# Patient Record
Sex: FEMALE | Race: WHITE | HISPANIC OR LATINO | Employment: OTHER | URBAN - METROPOLITAN AREA
[De-identification: names, ages, dates, MRNs, and addresses within clinical notes are randomized per-mention and may not be internally consistent; named-entity substitution may affect disease eponyms.]

---

## 2020-05-19 ENCOUNTER — OFFICE VISIT (OUTPATIENT)
Dept: FAMILY MEDICINE CLINIC | Facility: CLINIC | Age: 65
End: 2020-05-19
Payer: COMMERCIAL

## 2020-05-19 VITALS
HEIGHT: 57 IN | TEMPERATURE: 99 F | HEART RATE: 80 BPM | RESPIRATION RATE: 18 BRPM | OXYGEN SATURATION: 98 % | SYSTOLIC BLOOD PRESSURE: 144 MMHG | DIASTOLIC BLOOD PRESSURE: 82 MMHG | WEIGHT: 143 LBS | BODY MASS INDEX: 30.85 KG/M2

## 2020-05-19 DIAGNOSIS — Z11.59 ENCOUNTER FOR HEPATITIS C SCREENING TEST FOR LOW RISK PATIENT: ICD-10-CM

## 2020-05-19 DIAGNOSIS — E87.6 HYPOKALEMIA: ICD-10-CM

## 2020-05-19 DIAGNOSIS — M41.80 OTHER FORM OF SCOLIOSIS, UNSPECIFIED SPINAL REGION: ICD-10-CM

## 2020-05-19 DIAGNOSIS — E55.9 VITAMIN D DEFICIENCY: ICD-10-CM

## 2020-05-19 DIAGNOSIS — Z12.39 SCREENING FOR BREAST CANCER: ICD-10-CM

## 2020-05-19 DIAGNOSIS — M54.6 CHRONIC MIDLINE THORACIC BACK PAIN: ICD-10-CM

## 2020-05-19 DIAGNOSIS — I10 BENIGN ESSENTIAL HTN: ICD-10-CM

## 2020-05-19 DIAGNOSIS — E78.49 OTHER HYPERLIPIDEMIA: ICD-10-CM

## 2020-05-19 DIAGNOSIS — Z23 NEED FOR VACCINATION: ICD-10-CM

## 2020-05-19 DIAGNOSIS — M25.552 HIP PAIN, BILATERAL: ICD-10-CM

## 2020-05-19 DIAGNOSIS — L65.9 ALOPECIA: ICD-10-CM

## 2020-05-19 DIAGNOSIS — M19.90 ARTHRITIS: ICD-10-CM

## 2020-05-19 DIAGNOSIS — G89.29 CHRONIC MIDLINE THORACIC BACK PAIN: ICD-10-CM

## 2020-05-19 DIAGNOSIS — E11.9 TYPE 2 DIABETES MELLITUS WITHOUT COMPLICATION, WITHOUT LONG-TERM CURRENT USE OF INSULIN (HCC): ICD-10-CM

## 2020-05-19 DIAGNOSIS — K59.09 OTHER CONSTIPATION: ICD-10-CM

## 2020-05-19 DIAGNOSIS — M25.551 HIP PAIN, BILATERAL: ICD-10-CM

## 2020-05-19 DIAGNOSIS — Z76.89 ENCOUNTER TO ESTABLISH CARE WITH NEW DOCTOR: Primary | ICD-10-CM

## 2020-05-19 DIAGNOSIS — Z91.09 ENVIRONMENTAL ALLERGIES: ICD-10-CM

## 2020-05-19 PROCEDURE — 4040F PNEUMOC VAC/ADMIN/RCVD: CPT | Performed by: FAMILY MEDICINE

## 2020-05-19 PROCEDURE — 1036F TOBACCO NON-USER: CPT | Performed by: FAMILY MEDICINE

## 2020-05-19 PROCEDURE — 3077F SYST BP >= 140 MM HG: CPT | Performed by: FAMILY MEDICINE

## 2020-05-19 PROCEDURE — 4010F ACE/ARB THERAPY RXD/TAKEN: CPT | Performed by: FAMILY MEDICINE

## 2020-05-19 PROCEDURE — 90670 PCV13 VACCINE IM: CPT

## 2020-05-19 PROCEDURE — 3288F FALL RISK ASSESSMENT DOCD: CPT | Performed by: FAMILY MEDICINE

## 2020-05-19 PROCEDURE — 3008F BODY MASS INDEX DOCD: CPT | Performed by: FAMILY MEDICINE

## 2020-05-19 PROCEDURE — 1160F RVW MEDS BY RX/DR IN RCRD: CPT | Performed by: FAMILY MEDICINE

## 2020-05-19 PROCEDURE — 1101F PT FALLS ASSESS-DOCD LE1/YR: CPT | Performed by: FAMILY MEDICINE

## 2020-05-19 PROCEDURE — 3079F DIAST BP 80-89 MM HG: CPT | Performed by: FAMILY MEDICINE

## 2020-05-19 PROCEDURE — 99205 OFFICE O/P NEW HI 60 MIN: CPT | Performed by: FAMILY MEDICINE

## 2020-05-19 PROCEDURE — 90471 IMMUNIZATION ADMIN: CPT

## 2020-05-19 RX ORDER — METHOCARBAMOL 750 MG/1
TABLET ORAL
COMMUNITY
Start: 2020-02-28

## 2020-05-19 RX ORDER — OMEPRAZOLE 20 MG/1
CAPSULE, DELAYED RELEASE ORAL
COMMUNITY
Start: 2020-02-22 | End: 2020-08-03 | Stop reason: SDUPTHER

## 2020-05-19 RX ORDER — CANDESARTAN 16 MG/1
16 TABLET ORAL DAILY
COMMUNITY
Start: 2020-04-30 | End: 2020-06-01 | Stop reason: SDUPTHER

## 2020-05-19 RX ORDER — MONTELUKAST SODIUM 10 MG/1
1 TABLET ORAL DAILY
COMMUNITY
Start: 2020-02-28 | End: 2020-08-03 | Stop reason: SDUPTHER

## 2020-05-19 RX ORDER — DILTIAZEM HYDROCHLORIDE 240 MG/1
1 CAPSULE, EXTENDED RELEASE ORAL DAILY
COMMUNITY
Start: 2020-04-30 | End: 2020-07-29 | Stop reason: SDUPTHER

## 2020-05-19 RX ORDER — POTASSIUM CHLORIDE 1.5 G/1.77G
20 POWDER, FOR SOLUTION ORAL DAILY
COMMUNITY
End: 2020-06-09 | Stop reason: SDUPTHER

## 2020-05-19 RX ORDER — HYDROCHLOROTHIAZIDE 25 MG/1
25 TABLET ORAL DAILY
COMMUNITY
Start: 2020-04-30 | End: 2020-07-29 | Stop reason: SDUPTHER

## 2020-05-19 RX ORDER — ATORVASTATIN CALCIUM 40 MG/1
40 TABLET, FILM COATED ORAL
COMMUNITY
Start: 2020-04-30 | End: 2021-03-01 | Stop reason: SDUPTHER

## 2020-05-20 ENCOUNTER — TRANSCRIBE ORDERS (OUTPATIENT)
Dept: LAB | Facility: HOSPITAL | Age: 65
End: 2020-05-20

## 2020-05-30 ENCOUNTER — APPOINTMENT (OUTPATIENT)
Dept: LAB | Facility: HOSPITAL | Age: 65
End: 2020-05-30
Payer: COMMERCIAL

## 2020-05-30 DIAGNOSIS — E11.9 TYPE 2 DIABETES MELLITUS WITHOUT COMPLICATION, WITHOUT LONG-TERM CURRENT USE OF INSULIN (HCC): ICD-10-CM

## 2020-05-30 DIAGNOSIS — Z11.59 ENCOUNTER FOR HEPATITIS C SCREENING TEST FOR LOW RISK PATIENT: ICD-10-CM

## 2020-05-30 DIAGNOSIS — E55.9 VITAMIN D DEFICIENCY: ICD-10-CM

## 2020-05-30 DIAGNOSIS — M19.90 ARTHRITIS: ICD-10-CM

## 2020-05-30 DIAGNOSIS — E78.49 OTHER HYPERLIPIDEMIA: ICD-10-CM

## 2020-05-30 DIAGNOSIS — I10 BENIGN ESSENTIAL HTN: ICD-10-CM

## 2020-05-30 LAB
25(OH)D3 SERPL-MCNC: 57 NG/ML (ref 30–100)
ALBUMIN SERPL BCP-MCNC: 4 G/DL (ref 3.5–5)
ALP SERPL-CCNC: 82 U/L (ref 46–116)
ALT SERPL W P-5'-P-CCNC: 23 U/L (ref 12–78)
ANION GAP SERPL CALCULATED.3IONS-SCNC: 6 MMOL/L (ref 4–13)
AST SERPL W P-5'-P-CCNC: 14 U/L (ref 5–45)
BASOPHILS # BLD AUTO: 0.03 THOUSANDS/ΜL (ref 0–0.1)
BASOPHILS NFR BLD AUTO: 1 % (ref 0–1)
BILIRUB SERPL-MCNC: 0.48 MG/DL (ref 0.2–1)
BUN SERPL-MCNC: 13 MG/DL (ref 5–25)
CALCIUM SERPL-MCNC: 9.4 MG/DL (ref 8.3–10.1)
CHLORIDE SERPL-SCNC: 103 MMOL/L (ref 100–108)
CHOLEST SERPL-MCNC: 149 MG/DL (ref 50–200)
CO2 SERPL-SCNC: 30 MMOL/L (ref 21–32)
CREAT SERPL-MCNC: 0.49 MG/DL (ref 0.6–1.3)
CREAT UR-MCNC: 21.3 MG/DL
EOSINOPHIL # BLD AUTO: 0.14 THOUSAND/ΜL (ref 0–0.61)
EOSINOPHIL NFR BLD AUTO: 2 % (ref 0–6)
ERYTHROCYTE [DISTWIDTH] IN BLOOD BY AUTOMATED COUNT: 12.3 % (ref 11.6–15.1)
EST. AVERAGE GLUCOSE BLD GHB EST-MCNC: 126 MG/DL
GFR SERPL CREATININE-BSD FRML MDRD: 102 ML/MIN/1.73SQ M
GLUCOSE P FAST SERPL-MCNC: 97 MG/DL (ref 65–99)
HBA1C MFR BLD: 6 %
HCT VFR BLD AUTO: 40.4 % (ref 34.8–46.1)
HCV AB SER QL: NORMAL
HDLC SERPL-MCNC: 45 MG/DL
HGB BLD-MCNC: 13.2 G/DL (ref 11.5–15.4)
IMM GRANULOCYTES # BLD AUTO: 0.02 THOUSAND/UL (ref 0–0.2)
IMM GRANULOCYTES NFR BLD AUTO: 0 % (ref 0–2)
LDLC SERPL CALC-MCNC: 73 MG/DL (ref 0–100)
LYMPHOCYTES # BLD AUTO: 1.98 THOUSANDS/ΜL (ref 0.6–4.47)
LYMPHOCYTES NFR BLD AUTO: 32 % (ref 14–44)
MCH RBC QN AUTO: 31.1 PG (ref 26.8–34.3)
MCHC RBC AUTO-ENTMCNC: 32.7 G/DL (ref 31.4–37.4)
MCV RBC AUTO: 95 FL (ref 82–98)
MICROALBUMIN UR-MCNC: 10.2 MG/L (ref 0–20)
MICROALBUMIN/CREAT 24H UR: 48 MG/G CREATININE (ref 0–30)
MONOCYTES # BLD AUTO: 0.5 THOUSAND/ΜL (ref 0.17–1.22)
MONOCYTES NFR BLD AUTO: 8 % (ref 4–12)
NEUTROPHILS # BLD AUTO: 3.45 THOUSANDS/ΜL (ref 1.85–7.62)
NEUTS SEG NFR BLD AUTO: 57 % (ref 43–75)
NRBC BLD AUTO-RTO: 0 /100 WBCS
PLATELET # BLD AUTO: 216 THOUSANDS/UL (ref 149–390)
PMV BLD AUTO: 10.2 FL (ref 8.9–12.7)
POTASSIUM SERPL-SCNC: 3.4 MMOL/L (ref 3.5–5.3)
PROT SERPL-MCNC: 7.4 G/DL (ref 6.4–8.2)
RBC # BLD AUTO: 4.25 MILLION/UL (ref 3.81–5.12)
SODIUM SERPL-SCNC: 139 MMOL/L (ref 136–145)
TRIGL SERPL-MCNC: 156 MG/DL
WBC # BLD AUTO: 6.12 THOUSAND/UL (ref 4.31–10.16)

## 2020-05-30 PROCEDURE — 82306 VITAMIN D 25 HYDROXY: CPT

## 2020-05-30 PROCEDURE — 80053 COMPREHEN METABOLIC PANEL: CPT

## 2020-05-30 PROCEDURE — 86803 HEPATITIS C AB TEST: CPT

## 2020-05-30 PROCEDURE — 86430 RHEUMATOID FACTOR TEST QUAL: CPT

## 2020-05-30 PROCEDURE — 82043 UR ALBUMIN QUANTITATIVE: CPT

## 2020-05-30 PROCEDURE — 86431 RHEUMATOID FACTOR QUANT: CPT

## 2020-05-30 PROCEDURE — 80061 LIPID PANEL: CPT

## 2020-05-30 PROCEDURE — 86038 ANTINUCLEAR ANTIBODIES: CPT

## 2020-05-30 PROCEDURE — 36415 COLL VENOUS BLD VENIPUNCTURE: CPT

## 2020-05-30 PROCEDURE — 86200 CCP ANTIBODY: CPT

## 2020-05-30 PROCEDURE — 83036 HEMOGLOBIN GLYCOSYLATED A1C: CPT

## 2020-05-30 PROCEDURE — 85025 COMPLETE CBC W/AUTO DIFF WBC: CPT

## 2020-05-30 PROCEDURE — 3060F POS MICROALBUMINURIA REV: CPT | Performed by: FAMILY MEDICINE

## 2020-05-30 PROCEDURE — 82570 ASSAY OF URINE CREATININE: CPT

## 2020-05-30 PROCEDURE — 3044F HG A1C LEVEL LT 7.0%: CPT | Performed by: FAMILY MEDICINE

## 2020-06-01 DIAGNOSIS — E11.9 TYPE 2 DIABETES MELLITUS WITHOUT COMPLICATION, WITHOUT LONG-TERM CURRENT USE OF INSULIN (HCC): ICD-10-CM

## 2020-06-01 DIAGNOSIS — I10 BENIGN ESSENTIAL HYPERTENSION: Primary | ICD-10-CM

## 2020-06-01 LAB
CRYOGLOB RF SER-ACNC: ABNORMAL [IU]/ML
RHEUMATOID FACT SER QL LA: POSITIVE
RYE IGE QN: NEGATIVE

## 2020-06-01 RX ORDER — CANDESARTAN 32 MG/1
32 TABLET ORAL DAILY
Qty: 90 TABLET | Refills: 1 | Status: SHIPPED | OUTPATIENT
Start: 2020-06-01 | End: 2020-06-05

## 2020-06-03 ENCOUNTER — APPOINTMENT (OUTPATIENT)
Dept: RADIOLOGY | Age: 65
End: 2020-06-03
Payer: COMMERCIAL

## 2020-06-03 DIAGNOSIS — M25.552 HIP PAIN, BILATERAL: ICD-10-CM

## 2020-06-03 DIAGNOSIS — M54.6 CHRONIC MIDLINE THORACIC BACK PAIN: ICD-10-CM

## 2020-06-03 DIAGNOSIS — M25.551 HIP PAIN, BILATERAL: ICD-10-CM

## 2020-06-03 DIAGNOSIS — M41.80 OTHER FORM OF SCOLIOSIS, UNSPECIFIED SPINAL REGION: ICD-10-CM

## 2020-06-03 DIAGNOSIS — G89.29 CHRONIC MIDLINE THORACIC BACK PAIN: ICD-10-CM

## 2020-06-03 LAB — CCP IGA+IGG SERPL IA-ACNC: 11 UNITS (ref 0–19)

## 2020-06-03 PROCEDURE — 73521 X-RAY EXAM HIPS BI 2 VIEWS: CPT

## 2020-06-03 PROCEDURE — 72110 X-RAY EXAM L-2 SPINE 4/>VWS: CPT

## 2020-06-03 PROCEDURE — 72072 X-RAY EXAM THORAC SPINE 3VWS: CPT

## 2020-06-05 DIAGNOSIS — E11.9 TYPE 2 DIABETES MELLITUS WITHOUT COMPLICATION, WITHOUT LONG-TERM CURRENT USE OF INSULIN (HCC): ICD-10-CM

## 2020-06-05 RX ORDER — VALSARTAN 320 MG/1
320 TABLET ORAL DAILY
Qty: 30 TABLET | Refills: 1 | Status: SHIPPED | OUTPATIENT
Start: 2020-06-05 | End: 2020-07-08 | Stop reason: SDUPTHER

## 2020-06-09 DIAGNOSIS — E87.6 HYPOKALEMIA: Primary | ICD-10-CM

## 2020-06-09 RX ORDER — POTASSIUM CHLORIDE 1.5 G/1.77G
20 POWDER, FOR SOLUTION ORAL DAILY
Qty: 90 TABLET | Refills: 1 | Status: SHIPPED | OUTPATIENT
Start: 2020-06-09 | End: 2020-07-29

## 2020-06-30 ENCOUNTER — TELEPHONE (OUTPATIENT)
Dept: FAMILY MEDICINE CLINIC | Facility: CLINIC | Age: 65
End: 2020-06-30

## 2020-06-30 NOTE — TELEPHONE ENCOUNTER
Per patients daughter [employer] [address] told patient that her mothers Freestyle test strips aren't covered  I called the [address] [place] [address] and spoke with pharmacist Harriett Gonzalez who stated that patients strips are no longer covered by [employer]  He states that provider needs to order alternative strip?

## 2020-07-01 ENCOUNTER — DOCUMENTATION (OUTPATIENT)
Dept: FAMILY MEDICINE CLINIC | Facility: CLINIC | Age: 65
End: 2020-07-01

## 2020-07-01 DIAGNOSIS — E11.9 TYPE 2 DIABETES MELLITUS WITHOUT COMPLICATION, WITHOUT LONG-TERM CURRENT USE OF INSULIN (HCC): Primary | ICD-10-CM

## 2020-07-01 RX ORDER — LANCETS 30 GAUGE
1 EACH MISCELLANEOUS DAILY
COMMUNITY
End: 2020-07-01 | Stop reason: SDUPTHER

## 2020-07-01 RX ORDER — LANCETS 30 GAUGE
1 EACH MISCELLANEOUS DAILY
Qty: 100 EACH | Refills: 1 | Status: SHIPPED | OUTPATIENT
Start: 2020-07-01 | End: 2020-08-14 | Stop reason: SDUPTHER

## 2020-07-01 NOTE — TELEPHONE ENCOUNTER
I can order alternative strips, just have patient call insurance to see which brand is covered and we can send accordingly

## 2020-07-01 NOTE — PROGRESS NOTES
Formulary lookup documentation       Demographics  Interaction Method: Virtual  Type of Intervention: New    Topic(s) Addressed  Diabetes    Intervention(s) Made    Pharmacologic:      Medication Conversion - Non-Preferred to Preferred    Non-Pharmacologic:      Other    Tool(s) Used  Other    Time Spent:   Time Spent in Direct Patient Care: 0 minutes    Time Spent in Care Coordination: 10 minutes    Recommendation(s) Accepted by the Patient/Caregiver: Not Applicable

## 2020-07-01 NOTE — TELEPHONE ENCOUNTER
Spoke with patient's daughter, contour next glucometer given to her for patient  Will order strips and lancets

## 2020-07-01 NOTE — TELEPHONE ENCOUNTER
Contour or OneTouch should be covered  Confirm with patient and if she is getting new strips she will need a new meter as well to match

## 2020-07-08 DIAGNOSIS — E11.9 TYPE 2 DIABETES MELLITUS WITHOUT COMPLICATION, WITHOUT LONG-TERM CURRENT USE OF INSULIN (HCC): Primary | ICD-10-CM

## 2020-07-08 PROCEDURE — 4010F ACE/ARB THERAPY RXD/TAKEN: CPT | Performed by: FAMILY MEDICINE

## 2020-07-08 RX ORDER — VALSARTAN 320 MG/1
320 TABLET ORAL DAILY
Qty: 30 TABLET | Refills: 1 | Status: SHIPPED | OUTPATIENT
Start: 2020-07-08 | End: 2020-09-02

## 2020-07-29 DIAGNOSIS — I10 BENIGN ESSENTIAL HTN: Primary | ICD-10-CM

## 2020-07-29 RX ORDER — DILTIAZEM HYDROCHLORIDE 240 MG/1
240 CAPSULE, EXTENDED RELEASE ORAL DAILY
Qty: 90 CAPSULE | Refills: 3 | Status: SHIPPED | OUTPATIENT
Start: 2020-07-29 | End: 2021-03-01 | Stop reason: SDUPTHER

## 2020-07-29 RX ORDER — HYDROCHLOROTHIAZIDE 25 MG/1
25 TABLET ORAL DAILY
Qty: 90 TABLET | Refills: 3 | Status: SHIPPED | OUTPATIENT
Start: 2020-07-29 | End: 2021-03-01 | Stop reason: SDUPTHER

## 2020-07-29 RX ORDER — POTASSIUM CHLORIDE 20 MEQ/1
20 TABLET, EXTENDED RELEASE ORAL 2 TIMES DAILY
COMMUNITY
End: 2020-07-29 | Stop reason: SDUPTHER

## 2020-07-29 RX ORDER — POTASSIUM CHLORIDE 20 MEQ/1
20 TABLET, EXTENDED RELEASE ORAL 2 TIMES DAILY
Qty: 90 TABLET | Refills: 3 | Status: SHIPPED | OUTPATIENT
Start: 2020-07-29 | End: 2021-03-01 | Stop reason: SDUPTHER

## 2020-08-03 DIAGNOSIS — Z91.09 ENVIRONMENTAL ALLERGIES: Primary | ICD-10-CM

## 2020-08-03 RX ORDER — OMEPRAZOLE 20 MG/1
20 CAPSULE, DELAYED RELEASE ORAL DAILY
Qty: 90 CAPSULE | Refills: 3 | Status: SHIPPED | OUTPATIENT
Start: 2020-08-03 | End: 2020-11-10 | Stop reason: SDUPTHER

## 2020-08-03 RX ORDER — MONTELUKAST SODIUM 10 MG/1
10 TABLET ORAL DAILY
Qty: 90 TABLET | Refills: 3 | Status: SHIPPED | OUTPATIENT
Start: 2020-08-03 | End: 2021-03-01 | Stop reason: SDUPTHER

## 2020-08-13 ENCOUNTER — TELEPHONE (OUTPATIENT)
Dept: FAMILY MEDICINE CLINIC | Facility: CLINIC | Age: 65
End: 2020-08-13

## 2020-08-13 NOTE — TELEPHONE ENCOUNTER
We can try, but not sure that she would qualify for home health, that is more for assistance with wound care, taking medications, vital signs, medical issues, etc  Her  may qualify due to his parkinsons and those limitations     If she is requesting assistance with iADLs, she may be talking more about services that are available through office of aging and may benefit from a  referral

## 2020-08-13 NOTE — TELEPHONE ENCOUNTER
Patient called stating she was into to see you back and May  It was discussed that an order would be put in for her for home health Care  Please advise   Patient is requesting this order For someone to come out to her home and help her with ADLs

## 2020-08-14 DIAGNOSIS — E11.9 TYPE 2 DIABETES MELLITUS WITHOUT COMPLICATION, WITHOUT LONG-TERM CURRENT USE OF INSULIN (HCC): ICD-10-CM

## 2020-08-14 RX ORDER — LANCETS 30 GAUGE
1 EACH MISCELLANEOUS DAILY
Qty: 100 EACH | Refills: 1 | Status: SHIPPED | OUTPATIENT
Start: 2020-08-14 | End: 2021-03-01 | Stop reason: SDUPTHER

## 2020-08-19 DIAGNOSIS — E11.9 TYPE 2 DIABETES MELLITUS WITHOUT COMPLICATION, WITHOUT LONG-TERM CURRENT USE OF INSULIN (HCC): ICD-10-CM

## 2020-08-19 RX ORDER — BLOOD SUGAR DIAGNOSTIC
1 STRIP MISCELLANEOUS DAILY
Qty: 100 EACH | Refills: 1 | Status: SHIPPED | OUTPATIENT
Start: 2020-08-19 | End: 2020-11-10 | Stop reason: SDUPTHER

## 2020-09-02 DIAGNOSIS — E11.9 TYPE 2 DIABETES MELLITUS WITHOUT COMPLICATION, WITHOUT LONG-TERM CURRENT USE OF INSULIN (HCC): ICD-10-CM

## 2020-09-02 RX ORDER — VALSARTAN 320 MG/1
TABLET ORAL
Qty: 30 TABLET | Refills: 5 | Status: SHIPPED | OUTPATIENT
Start: 2020-09-02 | End: 2021-03-01 | Stop reason: SDUPTHER

## 2020-09-03 DIAGNOSIS — M25.551 RIGHT HIP PAIN: Primary | ICD-10-CM

## 2020-11-02 DIAGNOSIS — E11.9 TYPE 2 DIABETES MELLITUS WITHOUT COMPLICATION, WITHOUT LONG-TERM CURRENT USE OF INSULIN (HCC): ICD-10-CM

## 2020-11-10 ENCOUNTER — OFFICE VISIT (OUTPATIENT)
Dept: FAMILY MEDICINE CLINIC | Facility: CLINIC | Age: 65
End: 2020-11-10
Payer: COMMERCIAL

## 2020-11-10 VITALS
RESPIRATION RATE: 14 BRPM | HEIGHT: 57 IN | TEMPERATURE: 98.4 F | HEART RATE: 86 BPM | SYSTOLIC BLOOD PRESSURE: 132 MMHG | BODY MASS INDEX: 30.2 KG/M2 | OXYGEN SATURATION: 97 % | WEIGHT: 140 LBS | DIASTOLIC BLOOD PRESSURE: 84 MMHG

## 2020-11-10 DIAGNOSIS — E11.9 TYPE 2 DIABETES MELLITUS WITHOUT COMPLICATION, WITHOUT LONG-TERM CURRENT USE OF INSULIN (HCC): ICD-10-CM

## 2020-11-10 DIAGNOSIS — Z91.09 ENVIRONMENTAL ALLERGIES: ICD-10-CM

## 2020-11-10 DIAGNOSIS — I10 BENIGN ESSENTIAL HTN: Primary | ICD-10-CM

## 2020-11-10 DIAGNOSIS — Z12.11 COLON CANCER SCREENING: ICD-10-CM

## 2020-11-10 DIAGNOSIS — E78.49 OTHER HYPERLIPIDEMIA: ICD-10-CM

## 2020-11-10 PROCEDURE — 3008F BODY MASS INDEX DOCD: CPT | Performed by: FAMILY MEDICINE

## 2020-11-10 PROCEDURE — 3075F SYST BP GE 130 - 139MM HG: CPT | Performed by: FAMILY MEDICINE

## 2020-11-10 PROCEDURE — 1036F TOBACCO NON-USER: CPT | Performed by: FAMILY MEDICINE

## 2020-11-10 PROCEDURE — 1160F RVW MEDS BY RX/DR IN RCRD: CPT | Performed by: FAMILY MEDICINE

## 2020-11-10 PROCEDURE — 99214 OFFICE O/P EST MOD 30 MIN: CPT | Performed by: FAMILY MEDICINE

## 2020-11-10 PROCEDURE — 3079F DIAST BP 80-89 MM HG: CPT | Performed by: FAMILY MEDICINE

## 2020-11-10 RX ORDER — OMEPRAZOLE 20 MG/1
20 CAPSULE, DELAYED RELEASE ORAL DAILY
Qty: 90 CAPSULE | Refills: 3 | Status: SHIPPED | OUTPATIENT
Start: 2020-11-10 | End: 2021-03-01 | Stop reason: SDUPTHER

## 2020-11-10 RX ORDER — ASPIRIN 81 MG/1
81 TABLET ORAL DAILY
COMMUNITY

## 2020-11-10 RX ORDER — BLOOD SUGAR DIAGNOSTIC
STRIP MISCELLANEOUS
Qty: 100 EACH | Refills: 1 | Status: SHIPPED | OUTPATIENT
Start: 2020-11-10 | End: 2021-03-01 | Stop reason: SDUPTHER

## 2020-11-11 ENCOUNTER — APPOINTMENT (OUTPATIENT)
Dept: LAB | Facility: CLINIC | Age: 65
End: 2020-11-11
Payer: COMMERCIAL

## 2020-11-11 DIAGNOSIS — I10 BENIGN ESSENTIAL HTN: ICD-10-CM

## 2020-11-11 DIAGNOSIS — E11.9 TYPE 2 DIABETES MELLITUS WITHOUT COMPLICATION, WITHOUT LONG-TERM CURRENT USE OF INSULIN (HCC): ICD-10-CM

## 2020-11-11 LAB
ALBUMIN SERPL BCP-MCNC: 4 G/DL (ref 3.5–5)
ALP SERPL-CCNC: 83 U/L (ref 46–116)
ALT SERPL W P-5'-P-CCNC: 21 U/L (ref 12–78)
ANION GAP SERPL CALCULATED.3IONS-SCNC: 4 MMOL/L (ref 4–13)
AST SERPL W P-5'-P-CCNC: 11 U/L (ref 5–45)
BILIRUB SERPL-MCNC: 0.49 MG/DL (ref 0.2–1)
BUN SERPL-MCNC: 10 MG/DL (ref 5–25)
CALCIUM SERPL-MCNC: 9.6 MG/DL (ref 8.3–10.1)
CHLORIDE SERPL-SCNC: 102 MMOL/L (ref 100–108)
CHOLEST SERPL-MCNC: 150 MG/DL (ref 50–200)
CO2 SERPL-SCNC: 34 MMOL/L (ref 21–32)
CREAT SERPL-MCNC: 0.52 MG/DL (ref 0.6–1.3)
ERYTHROCYTE [DISTWIDTH] IN BLOOD BY AUTOMATED COUNT: 12.7 % (ref 11.6–15.1)
EST. AVERAGE GLUCOSE BLD GHB EST-MCNC: 137 MG/DL
GFR SERPL CREATININE-BSD FRML MDRD: 100 ML/MIN/1.73SQ M
GLUCOSE P FAST SERPL-MCNC: 118 MG/DL (ref 65–99)
HBA1C MFR BLD: 6.4 %
HCT VFR BLD AUTO: 40.9 % (ref 34.8–46.1)
HDLC SERPL-MCNC: 46 MG/DL
HGB BLD-MCNC: 13.4 G/DL (ref 11.5–15.4)
LDLC SERPL CALC-MCNC: 72 MG/DL (ref 0–100)
MCH RBC QN AUTO: 31.3 PG (ref 26.8–34.3)
MCHC RBC AUTO-ENTMCNC: 32.8 G/DL (ref 31.4–37.4)
MCV RBC AUTO: 96 FL (ref 82–98)
NONHDLC SERPL-MCNC: 104 MG/DL
PLATELET # BLD AUTO: 210 THOUSANDS/UL (ref 149–390)
PMV BLD AUTO: 10.5 FL (ref 8.9–12.7)
POTASSIUM SERPL-SCNC: 3.9 MMOL/L (ref 3.5–5.3)
PROT SERPL-MCNC: 7.6 G/DL (ref 6.4–8.2)
RBC # BLD AUTO: 4.28 MILLION/UL (ref 3.81–5.12)
SODIUM SERPL-SCNC: 140 MMOL/L (ref 136–145)
TRIGL SERPL-MCNC: 158 MG/DL
TSH SERPL DL<=0.05 MIU/L-ACNC: 1.46 UIU/ML (ref 0.36–3.74)
WBC # BLD AUTO: 5.91 THOUSAND/UL (ref 4.31–10.16)

## 2020-11-11 PROCEDURE — 83036 HEMOGLOBIN GLYCOSYLATED A1C: CPT

## 2020-11-11 PROCEDURE — 3044F HG A1C LEVEL LT 7.0%: CPT | Performed by: FAMILY MEDICINE

## 2020-11-11 PROCEDURE — 80061 LIPID PANEL: CPT

## 2020-11-11 PROCEDURE — 80053 COMPREHEN METABOLIC PANEL: CPT

## 2020-11-11 PROCEDURE — 84443 ASSAY THYROID STIM HORMONE: CPT

## 2020-11-11 PROCEDURE — 36415 COLL VENOUS BLD VENIPUNCTURE: CPT

## 2020-11-11 PROCEDURE — 85027 COMPLETE CBC AUTOMATED: CPT

## 2020-11-12 ENCOUNTER — TELEPHONE (OUTPATIENT)
Dept: FAMILY MEDICINE CLINIC | Facility: CLINIC | Age: 65
End: 2020-11-12

## 2020-11-27 DIAGNOSIS — E11.9 TYPE 2 DIABETES MELLITUS WITHOUT COMPLICATION, WITHOUT LONG-TERM CURRENT USE OF INSULIN (HCC): ICD-10-CM

## 2020-12-16 DIAGNOSIS — E11.9 TYPE 2 DIABETES MELLITUS WITHOUT COMPLICATION, WITHOUT LONG-TERM CURRENT USE OF INSULIN (HCC): ICD-10-CM

## 2020-12-21 DIAGNOSIS — E11.9 TYPE 2 DIABETES MELLITUS WITHOUT COMPLICATION, WITHOUT LONG-TERM CURRENT USE OF INSULIN (HCC): ICD-10-CM

## 2021-01-08 ENCOUNTER — TELEPHONE (OUTPATIENT)
Dept: GASTROENTEROLOGY | Facility: CLINIC | Age: 66
End: 2021-01-08

## 2021-02-25 ENCOUNTER — TELEMEDICINE (OUTPATIENT)
Dept: FAMILY MEDICINE CLINIC | Facility: CLINIC | Age: 66
End: 2021-02-25
Payer: COMMERCIAL

## 2021-02-25 DIAGNOSIS — M25.50 ARTHRALGIA, UNSPECIFIED JOINT: ICD-10-CM

## 2021-02-25 DIAGNOSIS — I10 BENIGN ESSENTIAL HTN: ICD-10-CM

## 2021-02-25 DIAGNOSIS — E78.49 OTHER HYPERLIPIDEMIA: ICD-10-CM

## 2021-02-25 DIAGNOSIS — E11.9 TYPE 2 DIABETES MELLITUS WITHOUT COMPLICATION, WITHOUT LONG-TERM CURRENT USE OF INSULIN (HCC): Primary | ICD-10-CM

## 2021-02-25 PROCEDURE — 99214 OFFICE O/P EST MOD 30 MIN: CPT | Performed by: FAMILY MEDICINE

## 2021-02-26 PROBLEM — M25.50 ARTHRALGIA: Status: ACTIVE | Noted: 2021-02-26

## 2021-02-26 NOTE — ASSESSMENT & PLAN NOTE
Hypertension  Patient blood pressure was stable on last office visit with her PCP    She will continue with current dose of valsartan

## 2021-02-26 NOTE — ASSESSMENT & PLAN NOTE
Arthralgia  Patient had blood work last year showing positivity for rheumatoid factor    She will be referred to rheumatologist for further evaluation of her symptoms and evaluation of her blood test

## 2021-02-26 NOTE — ASSESSMENT & PLAN NOTE
Hyperlipidemia  Latest lipid panel is stable on current dose of statin therapy    New prescription will be sent to patient's pharmacy of choice

## 2021-02-26 NOTE — PROGRESS NOTES
Virtual Brief Visit    Assessment/Plan:    Problem List Items Addressed This Visit        Endocrine    Type 2 diabetes mellitus without complication, without long-term current use of insulin ([facility] [address] ) - Primary      Diabetes  A1c was stable on last blood test   She was given prescription to obtain blood work in April 2021 for follow-up  She will continue with current regimen of medications  Lab Results   Component Value Date    HGBA1C 6 4 (H) 11/11/2020               Cardiovascular and Mediastinum    Benign essential HTN      Hypertension  Patient blood pressure was stable on last office visit with her PCP  She will continue with current dose of valsartan            Other    Other hyperlipidemia       Hyperlipidemia  Latest lipid panel is stable on current dose of statin therapy  New prescription will be sent to patient's pharmacy of choice         Arthralgia      Arthralgia  Patient had blood work last year showing positivity for rheumatoid factor  She will be referred to rheumatologist for further evaluation of her symptoms and evaluation of her blood test                      Reason for visit is   Chief Complaint   Patient presents with    Virtual Brief Visit        Encounter provider Bernarda العلي MD    Provider located at [address] [place] [id number]    Recent Visits  Date Type Provider Dept   02/25/21 Victoriano Abdi MD [address] [facility] [place] [facility] recent visits within past 7 days and meeting all other requirements     Future Appointments  No visits were found meeting these conditions  Showing future appointments within next 150 days and meeting all other requirements        After connecting through telephone, the patient was identified by name and date of birth  Chick Adamaber was informed that this is a telemedicine visit and that the visit is being conducted through telephone  My office door was closed   No one else was in the room   She acknowledged consent and understanding of privacy and security of the platform  The patient has agreed to participate and understands she can discontinue the visit at any time  Patient is aware this is a billable service  It was my intent to perform this visit via video technology but the patient was not able to do a video connection so the visit was completed via audio telephone only  Subjective    Andree Suresh is a 76 y o  female   Patient having telemedicine visit to establish new PCP  She had been seeing a [facility] family physician and I reviewed latest blood test results from 2020  Patient has a history of diabetes as well as arthritis which causes significant discomfort in various joints  Patient has tried diclofenac cream and over-the-counter pain medication without relief in symptoms  Patient states she will be moving to [facility] next week but like to continue relationship with our office  She also requested referral to rheumatologist for further evaluation of her symptoms  No past medical history on file      Past Surgical History:   Procedure Laterality Date     SECTION      CHOLECYSTECTOMY      HYSTERECTOMY      per patient       Current Outpatient Medications   Medication Sig Dispense Refill    aspirin (ECOTRIN LOW STRENGTH) 81 mg EC tablet Take 81 mg by mouth daily      atorvastatin (LIPITOR) 40 mg tablet Take 40 mg by mouth daily at bedtime      CARTIA  MG 24 hr capsule Take 1 capsule (240 mg total) by mouth daily 90 capsule 3    D3-50 1 25 MG (41724 UT) capsule       diclofenac sodium (VOLTAREN) 1 % Apply 2 g topically 4 (four) times a day 1 Tube 1    glucose blood (Contour Next Test) test strip Test blood sugar once daily 100 each 1    hydrochlorothiazide (HYDRODIURIL) 25 mg tablet Take 1 tablet (25 mg total) by mouth daily 90 tablet 3    Lancets MISC 1 application by Does not apply route daily PATIENT TESTS DAILY 100 each 1    metFORMIN (GLUCOPHAGE) 1000 MG tablet take 1 tablet by mouth twice a day 60 tablet 2    metFORMIN (GLUCOPHAGE) 1000 MG tablet Take 1 tablet (1,000 mg total) by mouth 2 (two) times a day 180 tablet 3    montelukast (SINGULAIR) 10 mg tablet Take 1 tablet (10 mg total) by mouth daily 90 tablet 3    omeprazole (PriLOSEC) 20 mg delayed release capsule Take 1 capsule (20 mg total) by mouth daily 90 capsule 3    potassium chloride (K-DUR,KLOR-CON) 20 mEq tablet Take 1 tablet (20 mEq total) by mouth 2 (two) times a day 90 tablet 3    valsartan (DIOVAN) 320 MG tablet TAKE 1 TABLET BY MOUTH EVERY DAY 30 tablet 5     No current facility-administered medications for this visit  Allergies   Allergen Reactions    Aceinhibitors [Ace Inhibitors] Cough       Review of Systems   Constitutional: Negative  HENT: Negative  Respiratory: Negative  Cardiovascular: Negative  Gastrointestinal: Negative  Genitourinary: Negative  Musculoskeletal: Positive for arthralgias and gait problem  Psychiatric/Behavioral: Negative  There were no vitals filed for this visit  I spent 25 minutes with patient today in which greater than 50% of the time was spent in counseling/coordination of care regarding [place] acknowledges that she has consented to an online visit or consultation  She understands that the online visit is based solely on information provided by her, and that, in the absence of a face-to-face physical evaluation by the physician, the diagnosis she receives is both limited and provisional in terms of accuracy and completeness  This is not intended to replace a full medical face-to-face evaluation by the physician  Janet Sabillon understands and accepts these terms

## 2021-02-26 NOTE — ASSESSMENT & PLAN NOTE
Diabetes  A1c was stable on last blood test   She was given prescription to obtain blood work in April 2021 for follow-up    She will continue with current regimen of medications  Lab Results   Component Value Date    HGBA1C 6 4 (H) 11/11/2020

## 2021-03-01 DIAGNOSIS — E55.9 VITAMIN D DEFICIENCY: ICD-10-CM

## 2021-03-01 DIAGNOSIS — E78.5 HYPERLIPIDEMIA, UNSPECIFIED HYPERLIPIDEMIA TYPE: Primary | ICD-10-CM

## 2021-03-01 DIAGNOSIS — I10 BENIGN ESSENTIAL HTN: ICD-10-CM

## 2021-03-01 DIAGNOSIS — Z91.09 ENVIRONMENTAL ALLERGIES: ICD-10-CM

## 2021-03-01 DIAGNOSIS — E11.9 TYPE 2 DIABETES MELLITUS WITHOUT COMPLICATION, WITHOUT LONG-TERM CURRENT USE OF INSULIN (HCC): ICD-10-CM

## 2021-03-01 PROCEDURE — 4010F ACE/ARB THERAPY RXD/TAKEN: CPT | Performed by: FAMILY MEDICINE

## 2021-03-01 RX ORDER — LANCETS 30 GAUGE
1 EACH MISCELLANEOUS DAILY
Qty: 100 EACH | Refills: 1 | Status: SHIPPED | OUTPATIENT
Start: 2021-03-01 | End: 2021-06-02 | Stop reason: SDUPTHER

## 2021-03-01 RX ORDER — POTASSIUM CHLORIDE 20 MEQ/1
20 TABLET, EXTENDED RELEASE ORAL 2 TIMES DAILY
Qty: 90 TABLET | Refills: 1 | Status: SHIPPED | OUTPATIENT
Start: 2021-03-01 | End: 2021-05-26 | Stop reason: SDUPTHER

## 2021-03-01 RX ORDER — BLOOD SUGAR DIAGNOSTIC
STRIP MISCELLANEOUS
Qty: 100 EACH | Refills: 1 | Status: SHIPPED | OUTPATIENT
Start: 2021-03-01 | End: 2021-06-02 | Stop reason: SDUPTHER

## 2021-03-01 RX ORDER — DILTIAZEM HYDROCHLORIDE 240 MG/1
240 CAPSULE, EXTENDED RELEASE ORAL DAILY
Qty: 90 CAPSULE | Refills: 1 | Status: SHIPPED | OUTPATIENT
Start: 2021-03-01 | End: 2021-06-02 | Stop reason: SDUPTHER

## 2021-03-01 RX ORDER — HYDROCHLOROTHIAZIDE 25 MG/1
25 TABLET ORAL DAILY
Qty: 90 TABLET | Refills: 1 | Status: SHIPPED | OUTPATIENT
Start: 2021-03-01 | End: 2021-06-02 | Stop reason: SDUPTHER

## 2021-03-01 RX ORDER — MONTELUKAST SODIUM 10 MG/1
10 TABLET ORAL DAILY
Qty: 90 TABLET | Refills: 1 | Status: SHIPPED | OUTPATIENT
Start: 2021-03-01 | End: 2021-06-02 | Stop reason: SDUPTHER

## 2021-03-01 RX ORDER — ATORVASTATIN CALCIUM 40 MG/1
40 TABLET, FILM COATED ORAL
Qty: 90 TABLET | Refills: 1 | Status: SHIPPED | OUTPATIENT
Start: 2021-03-01 | End: 2021-06-02 | Stop reason: SDUPTHER

## 2021-03-01 RX ORDER — VALSARTAN 320 MG/1
320 TABLET ORAL DAILY
Qty: 90 TABLET | Refills: 1 | Status: SHIPPED | OUTPATIENT
Start: 2021-03-01 | End: 2021-06-02 | Stop reason: SDUPTHER

## 2021-03-01 RX ORDER — OMEPRAZOLE 20 MG/1
20 CAPSULE, DELAYED RELEASE ORAL DAILY
Qty: 90 CAPSULE | Refills: 1 | Status: SHIPPED | OUTPATIENT
Start: 2021-03-01 | End: 2021-06-02 | Stop reason: SDUPTHER

## 2021-05-26 DIAGNOSIS — I10 BENIGN ESSENTIAL HTN: ICD-10-CM

## 2021-05-26 RX ORDER — POTASSIUM CHLORIDE 20 MEQ/1
20 TABLET, EXTENDED RELEASE ORAL 2 TIMES DAILY
Qty: 90 TABLET | Refills: 1 | Status: SHIPPED | OUTPATIENT
Start: 2021-05-26 | End: 2021-06-02 | Stop reason: SDUPTHER

## 2021-06-02 ENCOUNTER — OFFICE VISIT (OUTPATIENT)
Dept: FAMILY MEDICINE CLINIC | Facility: CLINIC | Age: 66
End: 2021-06-02
Payer: COMMERCIAL

## 2021-06-02 VITALS
SYSTOLIC BLOOD PRESSURE: 138 MMHG | HEART RATE: 93 BPM | HEIGHT: 57 IN | WEIGHT: 139.6 LBS | RESPIRATION RATE: 15 BRPM | BODY MASS INDEX: 30.12 KG/M2 | DIASTOLIC BLOOD PRESSURE: 80 MMHG | OXYGEN SATURATION: 96 % | TEMPERATURE: 97.5 F

## 2021-06-02 DIAGNOSIS — I10 BENIGN ESSENTIAL HTN: ICD-10-CM

## 2021-06-02 DIAGNOSIS — G89.29 CHRONIC RIGHT-SIDED LOW BACK PAIN WITH RIGHT-SIDED SCIATICA: ICD-10-CM

## 2021-06-02 DIAGNOSIS — Z91.09 ENVIRONMENTAL ALLERGIES: ICD-10-CM

## 2021-06-02 DIAGNOSIS — E11.9 TYPE 2 DIABETES MELLITUS WITHOUT COMPLICATION, WITHOUT LONG-TERM CURRENT USE OF INSULIN (HCC): Primary | ICD-10-CM

## 2021-06-02 DIAGNOSIS — M54.41 CHRONIC RIGHT-SIDED LOW BACK PAIN WITH RIGHT-SIDED SCIATICA: ICD-10-CM

## 2021-06-02 DIAGNOSIS — E78.5 HYPERLIPIDEMIA, UNSPECIFIED HYPERLIPIDEMIA TYPE: ICD-10-CM

## 2021-06-02 LAB — SL AMB POCT HEMOGLOBIN AIC: 6.8 (ref ?–6.5)

## 2021-06-02 PROCEDURE — 3075F SYST BP GE 130 - 139MM HG: CPT | Performed by: FAMILY MEDICINE

## 2021-06-02 PROCEDURE — 99214 OFFICE O/P EST MOD 30 MIN: CPT | Performed by: FAMILY MEDICINE

## 2021-06-02 PROCEDURE — 1036F TOBACCO NON-USER: CPT | Performed by: FAMILY MEDICINE

## 2021-06-02 PROCEDURE — 3725F SCREEN DEPRESSION PERFORMED: CPT | Performed by: FAMILY MEDICINE

## 2021-06-02 PROCEDURE — 3044F HG A1C LEVEL LT 7.0%: CPT | Performed by: FAMILY MEDICINE

## 2021-06-02 PROCEDURE — 3079F DIAST BP 80-89 MM HG: CPT | Performed by: FAMILY MEDICINE

## 2021-06-02 PROCEDURE — 4010F ACE/ARB THERAPY RXD/TAKEN: CPT | Performed by: FAMILY MEDICINE

## 2021-06-02 PROCEDURE — 3008F BODY MASS INDEX DOCD: CPT | Performed by: FAMILY MEDICINE

## 2021-06-02 PROCEDURE — 83036 HEMOGLOBIN GLYCOSYLATED A1C: CPT | Performed by: FAMILY MEDICINE

## 2021-06-02 PROCEDURE — 1160F RVW MEDS BY RX/DR IN RCRD: CPT | Performed by: FAMILY MEDICINE

## 2021-06-02 RX ORDER — DILTIAZEM HYDROCHLORIDE 240 MG/1
240 CAPSULE, EXTENDED RELEASE ORAL DAILY
Qty: 90 CAPSULE | Refills: 3 | Status: SHIPPED | OUTPATIENT
Start: 2021-06-02 | End: 2022-06-27

## 2021-06-02 RX ORDER — POTASSIUM CHLORIDE 20 MEQ/1
20 TABLET, EXTENDED RELEASE ORAL 2 TIMES DAILY
Qty: 60 TABLET | Refills: 5 | Status: SHIPPED | OUTPATIENT
Start: 2021-06-02 | End: 2021-11-15

## 2021-06-02 RX ORDER — BLOOD-GLUCOSE METER
EACH MISCELLANEOUS
COMMUNITY
Start: 2021-03-01

## 2021-06-02 RX ORDER — ATORVASTATIN CALCIUM 40 MG/1
40 TABLET, FILM COATED ORAL
Qty: 90 TABLET | Refills: 3 | Status: SHIPPED | OUTPATIENT
Start: 2021-06-02 | End: 2022-06-02

## 2021-06-02 RX ORDER — TRAMADOL HYDROCHLORIDE 50 MG/1
50 TABLET ORAL
COMMUNITY
Start: 2021-05-24

## 2021-06-02 RX ORDER — BLOOD SUGAR DIAGNOSTIC
STRIP MISCELLANEOUS
Qty: 100 EACH | Refills: 1 | Status: SHIPPED | OUTPATIENT
Start: 2021-06-02

## 2021-06-02 RX ORDER — TRAMADOL HYDROCHLORIDE 50 MG/1
TABLET ORAL
COMMUNITY
Start: 2021-05-28 | End: 2021-06-02 | Stop reason: ALTCHOICE

## 2021-06-02 RX ORDER — LOSARTAN POTASSIUM AND HYDROCHLOROTHIAZIDE 25; 100 MG/1; MG/1
1 TABLET ORAL DAILY
COMMUNITY
End: 2021-06-02 | Stop reason: ALTCHOICE

## 2021-06-02 RX ORDER — HYDROCHLOROTHIAZIDE 25 MG/1
25 TABLET ORAL DAILY
Qty: 90 TABLET | Refills: 3 | Status: SHIPPED | OUTPATIENT
Start: 2021-06-02 | End: 2022-06-02

## 2021-06-02 RX ORDER — LANCETS 30 GAUGE
1 EACH MISCELLANEOUS DAILY
Qty: 100 EACH | Refills: 1 | Status: SHIPPED | OUTPATIENT
Start: 2021-06-02

## 2021-06-02 RX ORDER — MONTELUKAST SODIUM 10 MG/1
10 TABLET ORAL DAILY
Qty: 90 TABLET | Refills: 3 | Status: SHIPPED | OUTPATIENT
Start: 2021-06-02

## 2021-06-02 RX ORDER — VALSARTAN 320 MG/1
320 TABLET ORAL DAILY
Qty: 90 TABLET | Refills: 3 | Status: SHIPPED | OUTPATIENT
Start: 2021-06-02

## 2021-06-02 RX ORDER — OMEPRAZOLE 20 MG/1
20 CAPSULE, DELAYED RELEASE ORAL DAILY
Qty: 90 CAPSULE | Refills: 3 | Status: SHIPPED | OUTPATIENT
Start: 2021-06-02 | End: 2022-06-15

## 2021-06-02 NOTE — PROGRESS NOTES
FAMILY PRACTICE OFFICE VISIT       NAME: Jace Najera  AGE: 78 y o  SEX: female       : 1955        MRN: [id number]    DATE: 2021  TIME: 8:29 AM    Assessment and Plan     Problem List Items Addressed This Visit        Endocrine    Type 2 diabetes mellitus without complication, without long-term current use of insulin ([facility] [address] ) - Primary      Diabetes  Patient's A1c is stable at 6 8  She will continue with current regimen of medications  Her foot exam is up-to-date  Lab Results   Component Value Date    HGBA1C 6 8 (A) 2021            Relevant Medications    metFORMIN (GLUCOPHAGE) 1000 MG tablet    valsartan (DIOVAN) 320 MG tablet    glucose blood (Contour Next Test) test strip    Lancets MISC    Other Relevant Orders    POCT hemoglobin A1c (Completed)       Cardiovascular and Mediastinum    Benign essential HTN      Hypertension  Patient blood pressure is stable at this time she will continue with current regimen of medications         Relevant Medications    potassium chloride (K-DUR,KLOR-CON) 20 mEq tablet    atorvastatin (LIPITOR) 40 mg tablet    hydrochlorothiazide (HYDRODIURIL) 25 mg tablet    Cartia  MG 24 hr capsule    valsartan (DIOVAN) 320 MG tablet       Nervous and Auditory    Chronic right-sided low back pain with right-sided sciatica      Back pain  Patient was referred to physical therapy for evaluation of her chronic intermittent back pain         Relevant Orders    Ambulatory referral to Physical Therapy       Other    Hyperlipidemia       Hyperlipidemia  Lipid panel stable on current dose of statin therapy         Relevant Medications    atorvastatin (LIPITOR) 40 mg tablet    Environmental allergies       Environmental allergies  Patient will continue with current dose of montelukast          Relevant Medications    omeprazole (PriLOSEC) 20 mg delayed release capsule    montelukast (SINGULAIR) 10 mg tablet          BMI Counseling: Body mass index is 30 21 kg/m²   The BMI is above normal  Nutrition recommendations include decreasing portion sizes and moderation in carbohydrate intake  Exercise recommendations include exercising 3-5 times per week  Chief Complaint     Chief Complaint   Patient presents with    Follow-up     bmi dm eye/foot mammo  phq annual due        History of Present Illness      Patient the office to review chronic medical condition  She denies any recent illness  Patient with chronic intermittent low back pain from degenerative joint disease  I reviewed  the patient's most recent blood test results      Review of Systems   Review of Systems   Constitutional: Negative  HENT: Negative  Eyes: Negative  Respiratory: Negative  Cardiovascular: Negative  Gastrointestinal: Negative  Genitourinary: Negative  Musculoskeletal: Positive for arthralgias, back pain and gait problem  Skin: Negative  Psychiatric/Behavioral: Negative  Active Problem List     Patient Active Problem List   Diagnosis    Benign essential HTN    Type 2 diabetes mellitus without complication, without long-term current use of insulin (HCC)    Hypokalemia    Other constipation    Hyperlipidemia    Arthralgia    Environmental allergies    Chronic right-sided low back pain with right-sided sciatica       Past Medical History:  History reviewed  No pertinent past medical history  Past Surgical History:  Past Surgical History:   Procedure Laterality Date     SECTION      CHOLECYSTECTOMY      HYSTERECTOMY      per patient       Family History:  History reviewed  No pertinent family history      Social History:  Social History     Socioeconomic History    Marital status: /Civil Union     Spouse name: Not on file    Number of children: Not on file    Years of education: Not on file    Highest education level: Not on file   Occupational History    Not on file   Social Needs    Financial resource strain: Not on file   Víctor-Rishi insecurity     Worry: Not on file     Inability: Not on file    Transportation needs     Medical: Not on file     Non-medical: Not on file   Tobacco Use    Smoking status: Former Smoker     Packs/day: 0 50     Years: 15 00     Pack years: 7 50    Smokeless tobacco: Never Used   Substance and Sexual Activity    Alcohol use: Not Currently     Frequency: Never    Drug use: Never    Sexual activity: Not Currently     Partners: Male     Birth control/protection: None   Lifestyle    Physical activity     Days per week: Not on file     Minutes per session: Not on file    Stress: Not on file   Relationships    Social connections     Talks on phone: Not on file     Gets together: Not on file     Attends Sikh service: Not on file     Active member of club or organization: Not on file     Attends meetings of clubs or organizations: Not on file     Relationship status: Not on file    Intimate partner violence     Fear of current or ex partner: Not on file     Emotionally abused: Not on file     Physically abused: Not on file     Forced sexual activity: Not on file   Other Topics Concern    Not on file   Social History Narrative    Not on file       Objective     Vitals:    06/02/21 1053   BP: 138/80   Pulse: 93   Resp: 15   Temp: 97 5 °F (36 4 °C)   SpO2: 96%     Wt Readings from Last 3 Encounters:   06/02/21 63 3 kg (139 lb 9 6 oz)   11/10/20 63 5 kg (140 lb)   05/19/20 64 9 kg (143 lb)       Physical Exam  Constitutional:       General: She is not in acute distress  Appearance: Normal appearance  She is not ill-appearing  HENT:      Head: Normocephalic and atraumatic  Right Ear: Tympanic membrane, ear canal and external ear normal  There is no impacted cerumen  Left Ear: Tympanic membrane, ear canal and external ear normal  There is no impacted cerumen  Eyes:      General:         Right eye: No discharge  Left eye: No discharge        Extraocular Movements: Extraocular movements intact  Conjunctiva/sclera: Conjunctivae normal       Pupils: Pupils are equal, round, and reactive to light  Neck:      Vascular: No carotid bruit  Cardiovascular:      Rate and Rhythm: Normal rate and regular rhythm  Pulses:           Dorsalis pedis pulses are 2+ on the right side and 2+ on the left side  Posterior tibial pulses are 2+ on the right side and 2+ on the left side  Heart sounds: Normal heart sounds  No murmur  Pulmonary:      Effort: Pulmonary effort is normal       Breath sounds: Normal breath sounds  No wheezing, rhonchi or rales  Abdominal:      General: Abdomen is flat  Bowel sounds are normal  There is no distension  Palpations: Abdomen is soft  Tenderness: There is no abdominal tenderness  There is no guarding or rebound  Musculoskeletal:      Right lower leg: No edema  Left lower leg: No edema  Comments: Muscle strength 5/5 lower extremities  Minimal tenderness to palpation along right greater trochanter  Normal range of motion of hip  Negative straight leg raising bilaterally   Feet:      Right foot:      Skin integrity: No ulcer, skin breakdown, erythema, warmth, callus or dry skin  Left foot:      Skin integrity: No ulcer, skin breakdown, erythema, warmth, callus or dry skin  Lymphadenopathy:      Cervical: No cervical adenopathy  Skin:     Findings: No rash  Neurological:      General: No focal deficit present  Mental Status: She is alert and oriented to person, place, and time  Cranial Nerves: No cranial nerve deficit  Psychiatric:         Mood and Affect: Mood normal          Behavior: Behavior normal          Thought Content: Thought content normal          Judgment: Judgment normal        Patient's shoes and socks removed  Right Foot/Ankle   Right Foot Inspection  Skin Exam: skin normal and skin intact no dry skin, no warmth, no callus, no erythema, no maceration, no abnormal color, no pre-ulcer, no ulcer and no callus                          Toe Exam: ROM and strength within normal limits  Sensory   Vibration: intact    Monofilament testing: intact  Vascular    The right DP pulse is 2+  The right PT pulse is 2+  Left Foot/Ankle  Left Foot Inspection  Skin Exam: skin normal and skin intactno dry skin, no warmth, no erythema, no maceration, normal color, no pre-ulcer, no ulcer and no callus                         Toe Exam: ROM and strength within normal limits                   Sensory   Vibration: intact    Monofilament: intact  Vascular    The left DP pulse is 2+  The left PT pulse is 2+  Assign Risk Category:  No deformity present;  No loss of protective sensation;        Risk: 0      Pertinent Laboratory/Diagnostic Studies:  Lab Results   Component Value Date    BUN 10 11/11/2020    CREATININE 0 52 (L) 11/11/2020    CALCIUM 9 6 11/11/2020    K 3 9 11/11/2020    CO2 34 (H) 11/11/2020     11/11/2020     Lab Results   Component Value Date    ALT 21 11/11/2020    AST 11 11/11/2020    ALKPHOS 83 11/11/2020       Lab Results   Component Value Date    WBC 5 91 11/11/2020    HGB 13 4 11/11/2020    HCT 40 9 11/11/2020    MCV 96 11/11/2020     11/11/2020       No results found for: TSH    No results found for: CHOL  Lab Results   Component Value Date    TRIG 158 (H) 11/11/2020     Lab Results   Component Value Date    HDL 46 11/11/2020     Lab Results   Component Value Date    LDLCALC 72 11/11/2020     Lab Results   Component Value Date    HGBA1C 6 8 (A) 06/02/2021       Results for orders placed or performed in visit on 06/02/21   POCT hemoglobin A1c   Result Value Ref Range    Hemoglobin A1C 6 8 (A) 6 5       Orders Placed This Encounter   Procedures    Ambulatory referral to Physical Therapy    POCT hemoglobin A1c       ALLERGIES:  Allergies   Allergen Reactions    Aceinhibitors [Ace Inhibitors] Cough    B Complex-Folic Acid Rash       Current Medications     Current Outpatient Medications Medication Sig Dispense Refill    aspirin (ECOTRIN LOW STRENGTH) 81 mg EC tablet Take 81 mg by mouth daily      atorvastatin (LIPITOR) 40 mg tablet Take 1 tablet (40 mg total) by mouth daily at bedtime 90 tablet 3    Blood Glucose Monitoring Suppl (OneTouch Verio) w/Device KIT Use as directed      Cartia  MG 24 hr capsule Take 1 capsule (240 mg total) by mouth daily 90 capsule 3    D3-50 1 25 MG (27581 UT) capsule       diclofenac sodium (VOLTAREN) 1 % Apply 2 g topically 4 (four) times a day 1 Tube 1    glucose blood (Contour Next Test) test strip Test blood sugar once daily 100 each 1    hydrochlorothiazide (HYDRODIURIL) 25 mg tablet Take 1 tablet (25 mg total) by mouth daily 90 tablet 3    Lancets MISC Use 1 application daily PATIENT TESTS DAILY 100 each 1    metFORMIN (GLUCOPHAGE) 1000 MG tablet Take 1 tablet (1,000 mg total) by mouth 2 (two) times a day 180 tablet 3    montelukast (SINGULAIR) 10 mg tablet Take 1 tablet (10 mg total) by mouth daily 90 tablet 3    omeprazole (PriLOSEC) 20 mg delayed release capsule Take 1 capsule (20 mg total) by mouth daily 90 capsule 3    potassium chloride (K-DUR,KLOR-CON) 20 mEq tablet Take 1 tablet (20 mEq total) by mouth 2 (two) times a day 60 tablet 5    traMADol (ULTRAM) 50 mg tablet Take 50 mg by mouth      valsartan (DIOVAN) 320 MG tablet Take 1 tablet (320 mg total) by mouth daily 90 tablet 3     No current facility-administered medications for this visit            Health Maintenance     Health Maintenance   Topic Date Due    MAMMOGRAM  Never done    DM Eye Exam  Never done    COVID-19 Vaccine (1) Never done    Annual Physical  Never done    DTaP,Tdap,and Td Vaccines (1 - Tdap) Never done    Colorectal Cancer Screening  Never done    Fall Risk  05/19/2021    BMI: Followup Plan  05/19/2021    Pneumococcal Vaccine: 65+ Years (2 of 2 - PPSV23) 05/19/2021    HEMOGLOBIN A1C  12/02/2021    Depression Screening PHQ  06/02/2022    BMI: Adult 06/02/2022    Diabetic Foot Exam  06/02/2022    Hepatitis C Screening  Completed    Influenza Vaccine  Completed    HIB Vaccine  Aged Out    Hepatitis B Vaccine  Aged Out    IPV Vaccine  Aged Out    Hepatitis A Vaccine  Aged Out    Meningococcal ACWY Vaccine  Aged Out    HPV Vaccine  Aged Out     Immunization History   Administered Date(s) Administered    INFLUENZA 11/02/2020    Pneumococcal Conjugate 13-Valent 35/50/1709       Edie Lopez MD     I spent 25 minutes with this patient which greater than 50% was spent counseling

## 2021-06-03 ENCOUNTER — TRANSCRIBE ORDERS (OUTPATIENT)
Dept: FAMILY MEDICINE CLINIC | Facility: CLINIC | Age: 66
End: 2021-06-03

## 2021-06-03 DIAGNOSIS — E11.9 TYPE 2 DIABETES MELLITUS WITHOUT COMPLICATION, WITHOUT LONG-TERM CURRENT USE OF INSULIN (HCC): Primary | ICD-10-CM

## 2021-06-04 ENCOUNTER — TELEPHONE (OUTPATIENT)
Dept: FAMILY MEDICINE CLINIC | Facility: CLINIC | Age: 66
End: 2021-06-04

## 2021-06-07 PROBLEM — E78.5 HYPERLIPIDEMIA: Status: ACTIVE | Noted: 2020-05-19

## 2021-06-07 PROBLEM — M54.41 CHRONIC RIGHT-SIDED LOW BACK PAIN WITH RIGHT-SIDED SCIATICA: Status: ACTIVE | Noted: 2021-06-07

## 2021-06-07 PROBLEM — G89.29 CHRONIC RIGHT-SIDED LOW BACK PAIN WITH RIGHT-SIDED SCIATICA: Status: ACTIVE | Noted: 2021-06-07

## 2021-06-07 PROBLEM — Z91.09 ENVIRONMENTAL ALLERGIES: Status: ACTIVE | Noted: 2021-06-07

## 2021-06-07 NOTE — ASSESSMENT & PLAN NOTE
Diabetes  Patient's A1c is stable at 6 8  She will continue with current regimen of medications  Her foot exam is up-to-date    Lab Results   Component Value Date    HGBA1C 6 8 (A) 06/02/2021

## 2021-06-07 NOTE — ASSESSMENT & PLAN NOTE
Back pain    Patient was referred to physical therapy for evaluation of her chronic intermittent back pain

## 2021-09-23 ENCOUNTER — TELEPHONE (OUTPATIENT)
Dept: FAMILY MEDICINE CLINIC | Facility: CLINIC | Age: 66
End: 2021-09-23

## 2021-09-23 NOTE — TELEPHONE ENCOUNTER
A blood sugar of 1  after eating is very normal   Her last A1c test was stable at 6 8 in June  If patient still has concerns she could check A1c test with a nurse visit to compare if sugars are going higher or lower

## 2021-09-23 NOTE — TELEPHONE ENCOUNTER
Patient called stated that metformin isn't working, she stated that her blood sugar is 140-150 after eating  She stated that she isn't eating sugar or eating a carbs

## 2021-10-12 ENCOUNTER — TELEPHONE (OUTPATIENT)
Dept: GASTROENTEROLOGY | Facility: CLINIC | Age: 66
End: 2021-10-12

## 2021-10-12 ENCOUNTER — CONSULT (OUTPATIENT)
Dept: GASTROENTEROLOGY | Facility: CLINIC | Age: 66
End: 2021-10-12
Payer: COMMERCIAL

## 2021-10-12 VITALS
TEMPERATURE: 97.6 F | HEIGHT: 57 IN | HEART RATE: 91 BPM | BODY MASS INDEX: 29.95 KG/M2 | WEIGHT: 138.8 LBS | DIASTOLIC BLOOD PRESSURE: 80 MMHG | SYSTOLIC BLOOD PRESSURE: 160 MMHG

## 2021-10-12 DIAGNOSIS — Z12.11 COLON CANCER SCREENING: ICD-10-CM

## 2021-10-12 PROCEDURE — 3077F SYST BP >= 140 MM HG: CPT | Performed by: PHYSICIAN ASSISTANT

## 2021-10-12 PROCEDURE — 99243 OFF/OP CNSLTJ NEW/EST LOW 30: CPT | Performed by: PHYSICIAN ASSISTANT

## 2021-10-12 PROCEDURE — 3079F DIAST BP 80-89 MM HG: CPT | Performed by: PHYSICIAN ASSISTANT

## 2021-11-04 ENCOUNTER — OFFICE VISIT (OUTPATIENT)
Dept: FAMILY MEDICINE CLINIC | Facility: CLINIC | Age: 66
End: 2021-11-04
Payer: MEDICARE

## 2021-11-04 VITALS
SYSTOLIC BLOOD PRESSURE: 140 MMHG | HEIGHT: 57 IN | HEART RATE: 87 BPM | DIASTOLIC BLOOD PRESSURE: 70 MMHG | RESPIRATION RATE: 18 BRPM | BODY MASS INDEX: 30.2 KG/M2 | WEIGHT: 140 LBS | OXYGEN SATURATION: 97 % | TEMPERATURE: 97.6 F

## 2021-11-04 DIAGNOSIS — Z23 ENCOUNTER FOR IMMUNIZATION: Primary | ICD-10-CM

## 2021-11-04 DIAGNOSIS — E78.5 HYPERLIPIDEMIA, UNSPECIFIED HYPERLIPIDEMIA TYPE: ICD-10-CM

## 2021-11-04 DIAGNOSIS — E11.9 TYPE 2 DIABETES MELLITUS WITHOUT COMPLICATION, WITHOUT LONG-TERM CURRENT USE OF INSULIN (HCC): ICD-10-CM

## 2021-11-04 DIAGNOSIS — E87.6 HYPOKALEMIA: ICD-10-CM

## 2021-11-04 DIAGNOSIS — I10 BENIGN ESSENTIAL HTN: ICD-10-CM

## 2021-11-04 PROCEDURE — 99214 OFFICE O/P EST MOD 30 MIN: CPT | Performed by: FAMILY MEDICINE

## 2021-11-04 PROCEDURE — 90732 PPSV23 VACC 2 YRS+ SUBQ/IM: CPT

## 2021-11-04 PROCEDURE — 90471 IMMUNIZATION ADMIN: CPT

## 2021-11-04 PROCEDURE — 3078F DIAST BP <80 MM HG: CPT | Performed by: FAMILY MEDICINE

## 2021-11-04 PROCEDURE — 3077F SYST BP >= 140 MM HG: CPT | Performed by: FAMILY MEDICINE

## 2021-11-14 DIAGNOSIS — I10 BENIGN ESSENTIAL HTN: ICD-10-CM

## 2021-11-15 RX ORDER — POTASSIUM CHLORIDE 20 MEQ/1
TABLET, EXTENDED RELEASE ORAL
Qty: 60 TABLET | Refills: 5 | Status: SHIPPED | OUTPATIENT
Start: 2021-11-15 | End: 2022-07-29

## 2022-03-21 ENCOUNTER — VBI (OUTPATIENT)
Dept: ADMINISTRATIVE | Facility: OTHER | Age: 67
End: 2022-03-21

## 2022-06-02 ENCOUNTER — TELEPHONE (OUTPATIENT)
Dept: FAMILY MEDICINE CLINIC | Facility: CLINIC | Age: 67
End: 2022-06-02

## 2022-06-02 NOTE — TELEPHONE ENCOUNTER
As per patient's daughter, pt moved to [place], [place] and will no longer be Dr Sondra Ahuja patient  Please update pt's chart to reflect we are no longer pt's PCP

## 2022-06-15 DIAGNOSIS — Z91.09 ENVIRONMENTAL ALLERGIES: ICD-10-CM

## 2022-06-15 RX ORDER — OMEPRAZOLE 20 MG/1
CAPSULE, DELAYED RELEASE ORAL
Qty: 90 CAPSULE | Refills: 3 | Status: SHIPPED | OUTPATIENT
Start: 2022-06-15

## 2022-06-24 DIAGNOSIS — I10 BENIGN ESSENTIAL HTN: ICD-10-CM

## 2022-06-24 NOTE — TELEPHONE ENCOUNTER
06/24/22 2:36 PM        Thank you for your request  Your request has been received, reviewed, and the patient chart updated  The PCP has successfully been removed with a patient attribution note  This message will now be completed          Thank you  Richa Welch

## 2022-06-24 NOTE — TELEPHONE ENCOUNTER
06/24/22 2:37 PM        Thank you for your request  Your request has been received, reviewed, and the patient chart updated  The PCP has successfully been removed with a patient attribution note  This message will now be completed          Thank you  Uriel Atkinson

## 2022-06-27 RX ORDER — DILTIAZEM HYDROCHLORIDE 240 MG/1
CAPSULE, COATED, EXTENDED RELEASE ORAL
Qty: 90 CAPSULE | Refills: 3 | Status: SHIPPED | OUTPATIENT
Start: 2022-06-27

## 2022-07-28 DIAGNOSIS — I10 BENIGN ESSENTIAL HTN: ICD-10-CM

## 2022-07-29 RX ORDER — POTASSIUM CHLORIDE 20 MEQ/1
TABLET, EXTENDED RELEASE ORAL
Qty: 60 TABLET | Refills: 5 | Status: SHIPPED | OUTPATIENT
Start: 2022-07-29

## 2022-08-16 DIAGNOSIS — E11.9 TYPE 2 DIABETES MELLITUS WITHOUT COMPLICATION, WITHOUT LONG-TERM CURRENT USE OF INSULIN (HCC): ICD-10-CM

## 2022-08-16 RX ORDER — VALSARTAN 320 MG/1
TABLET ORAL
Qty: 90 TABLET | Refills: 3 | Status: SHIPPED | OUTPATIENT
Start: 2022-08-16

## 2024-09-16 DIAGNOSIS — E11.9 TYPE 2 DIABETES MELLITUS WITHOUT COMPLICATION, WITHOUT LONG-TERM CURRENT USE OF INSULIN (HCC): Primary | ICD-10-CM

## 2024-09-17 RX ORDER — BLOOD-GLUCOSE METER
EACH MISCELLANEOUS
Qty: 1 KIT | Refills: 0 | Status: SHIPPED | OUTPATIENT
Start: 2024-09-17

## 2024-11-22 ENCOUNTER — OFFICE VISIT (OUTPATIENT)
Dept: FAMILY MEDICINE CLINIC | Facility: CLINIC | Age: 69
End: 2024-11-22

## 2024-11-22 VITALS
TEMPERATURE: 97.6 F | WEIGHT: 144 LBS | DIASTOLIC BLOOD PRESSURE: 82 MMHG | SYSTOLIC BLOOD PRESSURE: 128 MMHG | OXYGEN SATURATION: 97 % | HEIGHT: 57 IN | BODY MASS INDEX: 31.07 KG/M2 | HEART RATE: 70 BPM

## 2024-11-22 DIAGNOSIS — Z00.00 MEDICARE ANNUAL WELLNESS VISIT, SUBSEQUENT: Primary | ICD-10-CM

## 2024-11-22 DIAGNOSIS — E78.5 HYPERLIPIDEMIA, UNSPECIFIED HYPERLIPIDEMIA TYPE: ICD-10-CM

## 2024-11-22 DIAGNOSIS — E55.9 VITAMIN D DEFICIENCY: ICD-10-CM

## 2024-11-22 DIAGNOSIS — E28.39 ESTROGEN DEFICIENCY: ICD-10-CM

## 2024-11-22 DIAGNOSIS — E11.9 TYPE 2 DIABETES MELLITUS WITHOUT COMPLICATION, WITHOUT LONG-TERM CURRENT USE OF INSULIN (HCC): ICD-10-CM

## 2024-11-22 DIAGNOSIS — E87.6 HYPOKALEMIA: ICD-10-CM

## 2024-11-22 DIAGNOSIS — M25.50 ARTHRALGIA, UNSPECIFIED JOINT: ICD-10-CM

## 2024-11-22 DIAGNOSIS — I10 BENIGN ESSENTIAL HTN: ICD-10-CM

## 2024-11-22 DIAGNOSIS — L65.9 ALOPECIA: ICD-10-CM

## 2024-11-22 LAB
CREAT UR-MCNC: 53 MG/DL
LEFT EYE DIABETIC RETINOPATHY: NORMAL
LEFT EYE IMAGE QUALITY: NORMAL
LEFT EYE MACULAR EDEMA: NORMAL
LEFT EYE OTHER RETINOPATHY: NORMAL
MICROALBUMIN UR-MCNC: 150.5 MG/L
MICROALBUMIN/CREAT 24H UR: 284 MG/G CREATININE (ref 0–30)
RIGHT EYE DIABETIC RETINOPATHY: NORMAL
RIGHT EYE IMAGE QUALITY: NORMAL
RIGHT EYE MACULAR EDEMA: NORMAL
RIGHT EYE OTHER RETINOPATHY: NORMAL
SEVERITY (EYE EXAM): NORMAL
SL AMB POCT HEMOGLOBIN AIC: 6.6 (ref ?–6.5)

## 2024-11-22 PROCEDURE — 82570 ASSAY OF URINE CREATININE: CPT | Performed by: NURSE PRACTITIONER

## 2024-11-22 PROCEDURE — 82043 UR ALBUMIN QUANTITATIVE: CPT | Performed by: NURSE PRACTITIONER

## 2024-11-22 RX ORDER — METOPROLOL SUCCINATE 25 MG/1
25 TABLET, EXTENDED RELEASE ORAL EVERY EVENING
Qty: 90 TABLET | Refills: 3 | Status: SHIPPED | OUTPATIENT
Start: 2024-11-22

## 2024-11-22 RX ORDER — ATORVASTATIN CALCIUM 40 MG/1
40 TABLET, FILM COATED ORAL
Qty: 90 TABLET | Refills: 3 | Status: SHIPPED | OUTPATIENT
Start: 2024-11-22

## 2024-11-22 RX ORDER — FAMOTIDINE 40 MG/1
TABLET, FILM COATED ORAL
COMMUNITY
Start: 2024-11-08

## 2024-11-22 RX ORDER — POTASSIUM CHLORIDE 1500 MG/1
1 TABLET, EXTENDED RELEASE ORAL DAILY
COMMUNITY
Start: 2024-11-08 | End: 2024-11-22 | Stop reason: SDUPTHER

## 2024-11-22 RX ORDER — FLUOCINONIDE TOPICAL SOLUTION USP, 0.05% 0.5 MG/ML
SOLUTION TOPICAL EVERY OTHER DAY
Qty: 180 ML | Refills: 3 | Status: SHIPPED | OUTPATIENT
Start: 2024-11-22

## 2024-11-22 RX ORDER — METOPROLOL SUCCINATE 25 MG/1
25 TABLET, EXTENDED RELEASE ORAL EVERY EVENING
COMMUNITY
Start: 2024-11-08 | End: 2024-11-22 | Stop reason: SDUPTHER

## 2024-11-22 RX ORDER — DILTIAZEM HYDROCHLORIDE 240 MG/1
240 CAPSULE, COATED, EXTENDED RELEASE ORAL DAILY
Qty: 90 CAPSULE | Refills: 3 | Status: SHIPPED | OUTPATIENT
Start: 2024-11-22

## 2024-11-22 NOTE — PROGRESS NOTES
Name: Jojo Brown      : 1955      MRN: [id number]  Encounter Provider: BETTE Prescott  Encounter Date: 2024   Encounter department: [facility]    Assessment & Plan  Medicare annual wellness visit, subsequent         Type 2 diabetes mellitus without complication, without long-term current use of insulin (HCC)    Lab Results   Component Value Date    HGBA1C 6.6 (A) 2024       Orders:    POCT hemoglobin A1c    Albumin / creatinine urine ratio    Lipid Panel with Direct LDL reflex; Future    IRIS Diabetic eye exam    Benign essential HTN    Orders:    Comprehensive metabolic panel; Future    CBC and Platelet; Future    metoprolol succinate (TOPROL-XL) 25 mg 24 hr tablet; Take 1 tablet (25 mg total) by mouth every evening    atorvastatin (LIPITOR) 40 mg tablet; Take 1 tablet (40 mg total) by mouth daily at bedtime    diltiazem (CARDIZEM CD) 240 mg 24 hr capsule; Take 1 capsule (240 mg total) by mouth daily    Alopecia    Orders:    fluocinonide (LIDEX) 0.05 % external solution; Apply topically every other day    Hypokalemia    Orders:    Lipid Panel with Direct LDL reflex; Future    Hyperlipidemia, unspecified hyperlipidemia type    Orders:    atorvastatin (LIPITOR) 40 mg tablet; Take 1 tablet (40 mg total) by mouth daily at bedtime    Estrogen deficiency    Orders:    DXA bone density spine hip and pelvis; Future    Arthralgia, unspecified joint    Orders:    Diclofenac Sodium (VOLTAREN) 1 %; Apply 2 g topically 4 (four) times a day    Vitamin D deficiency    Orders:    Vitamin D 25 hydroxy; Future      Depression Screening and Follow-up Plan: Patient was screened for depression during today's encounter. They screened negative with a PHQ-2 score of 0.      Preventive health issues were discussed with patient, and age appropriate screening tests were ordered as noted in patient's After Visit Summary. Personalized health advice and appropriate referrals for health  education or preventive services given if needed, as noted in patient's After Visit Summary.    History of Present Illness     69 y.o.female presenting to establish care and due for Medicare annual wellness visit. She mainly speaks Burmese so daughter is translating for her.          Patient Care Team:  BETTE Prescott as PCP - General (Family Medicine)  Anuj Vera MD as PCP - PCP-[facility] ([facility])  Charlie Hillman MD as PCP - PCP-[employer] ([facility])    Review of Systems   Constitutional:  Negative for activity change, appetite change and unexpected weight change.   HENT:  Negative for dental problem, ear pain, hearing loss, nosebleeds, sneezing, sore throat, tinnitus and trouble swallowing.    Eyes:  Negative for visual disturbance.   Respiratory:  Negative for cough, chest tightness, shortness of breath and wheezing.    Cardiovascular:  Negative for chest pain, palpitations and leg swelling.   Gastrointestinal:  Negative for abdominal distention, abdominal pain, constipation, diarrhea and nausea.   Endocrine: Negative for polydipsia, polyphagia and polyuria.   Genitourinary: Negative.    Musculoskeletal:  Negative for arthralgias, back pain, myalgias and neck pain.   Skin:  Negative for color change and rash.   Allergic/Immunologic: Negative for environmental allergies.   Neurological:  Negative for dizziness, weakness, light-headedness and headaches.   Hematological: Negative.    Psychiatric/Behavioral: Negative.  Negative for dysphoric mood and sleep disturbance. The patient is not nervous/anxious.      Medical History Reviewed by provider this encounter:       Annual Wellness Visit Questionnaire   Jojo is here for her Subsequent Wellness visit. Last Medicare Wellness visit information reviewed, patient interviewed and updates made to the record today.      Health Risk Assessment:   Patient rates overall health as fair. Patient feels that their physical health rating is same. Patient is  satisfied with their life. Eyesight was rated as same. Hearing was rated as same. Patient feels that their emotional and mental health rating is same. Patients states they are never, rarely angry. Patient states they are sometimes unusually tired/fatigued. Pain experienced in the last 7 days has been a lot. Patient's pain rating has been 8/10. Patient states that she has experienced no weight loss or gain in last 6 months.     Depression Screening:   PHQ-2 Score: 0      Fall Risk Screening:   In the past year, patient has experienced: no history of falling in past year      Urinary Incontinence Screening:   Patient has not leaked urine accidently in the last six months.     Home Safety:  Patient does not have trouble with stairs inside or outside of their home. Patient has working smoke alarms and has working carbon monoxide detector. Home safety hazards include: none.     Nutrition:   Current diet is Diabetic and No Added Salt.     Medications:   Patient is currently taking over-the-counter supplements. OTC medications include: see medication list. Patient is able to manage medications.     Activities of Daily Living (ADLs)/Instrumental Activities of Daily Living (IADLs):   Walk and transfer into and out of bed and chair?: Yes  Dress and groom yourself?: Yes    Bathe or shower yourself?: Yes    Feed yourself? Yes  Do your laundry/housekeeping?: No  Manage your money, pay your bills and track your expenses?: No  Make your own meals?: No    Do your own shopping?: No    Previous Hospitalizations:   Any hospitalizations or ED visits within the last 12 months?: No      Advance Care Planning:   Living will: No    Durable POA for healthcare: No    Advanced directive: No    Advanced directive counseling given: Yes    ACP document given: Yes      Cognitive Screening:   Provider or family/friend/caregiver concerned regarding cognition?: No    PREVENTIVE SCREENINGS      Cardiovascular Screening:    General: History Lipid  Disorder and Risks and Benefits Discussed    Due for: Lipid Panel      Diabetes Screening:     General: History Diabetes and Risks and Benefits Discussed    Due for: Blood Glucose      Colorectal Cancer Screening:     General: Screening Not Indicated      Breast Cancer Screening:     General: Risks and Benefits Discussed and Screening Not Indicated    Due for: Mammogram        Cervical Cancer Screening:    General: Screening Not Indicated      Abdominal Aortic Aneurysm (AAA) Screening:        General: Screening Not Indicated      Lung Cancer Screening:     General: Screening Not Indicated      Hepatitis C Screening:    General: Screening Current    Screening, Brief Intervention, and Referral to Treatment (SBIRT)    Screening  Typical number of drinks in a day: 0  Typical number of drinks in a week: 0  Interpretation: Low risk drinking behavior.    AUDIT-C Screenin) How often did you have a drink containing alcohol in the past year? never  2) How many drinks did you have on a typical day when you were drinking in the past year? 0  3) How often did you have 6 or more drinks on one occasion in the past year? never    AUDIT-C Score: 0  Interpretation: Score 0-2 (female): Negative screen for alcohol misuse    Single Item Drug Screening:  How often have you used an illegal drug (including marijuana) or a prescription medication for non-medical reasons in the past year? never    Single Item Drug Screen Score: 0  Interpretation: Negative screen for possible drug use disorder    Social Drivers of Health     Food Insecurity: No Food Insecurity (2024)    Hunger Vital Sign     Worried About Running Out of Food in the Last Year: Never true     Ran Out of Food in the Last Year: Never true   Transportation Needs: No Transportation Needs (2024)    PRAPARE - Transportation     Lack of Transportation (Medical): No     Lack of Transportation (Non-Medical): No   Housing Stability: Low Risk  (2024)    Housing  "Stability Vital Sign     Unable to Pay for Housing in the Last Year: No     Number of Times Moved in the Last Year: 0     Homeless in the Last Year: No   Utilities: Not At Risk (11/22/2024)    [facility] Utilities     Threatened with loss of utilities: No     No results found.    Objective   /82 (BP Location: Right arm, Patient Position: Sitting, Cuff Size: Standard)   Pulse 70   Temp 97.6 °F (36.4 °C) (Temporal)   Ht 4' 9\" (1.448 m)   Wt 65.3 kg (144 lb)   SpO2 97%   BMI 31.16 kg/m² (Reviewed)    Physical Exam  Vitals reviewed.   Constitutional:       General: She is not in acute distress.     Appearance: She is not ill-appearing.   HENT:      Head: Normocephalic and atraumatic.      Right Ear: External ear normal.      Left Ear: External ear normal.      Nose: Nose normal.      Mouth/Throat:      Mouth: Mucous membranes are moist.      Pharynx: Oropharynx is clear.   Eyes:      Extraocular Movements: Extraocular movements intact.      Conjunctiva/sclera: Conjunctivae normal.      Pupils: Pupils are equal, round, and reactive to light.   Neck:      Thyroid: No thyroid mass or thyroid tenderness.      Trachea: Trachea and phonation normal.   Cardiovascular:      Rate and Rhythm: Normal rate and regular rhythm.      Pulses: no weak pulses.           Dorsalis pedis pulses are 2+ on the right side and 2+ on the left side.        Posterior tibial pulses are 2+ on the right side and 2+ on the left side.      Heart sounds: Normal heart sounds.   Pulmonary:      Effort: Pulmonary effort is normal.      Breath sounds: Normal breath sounds.   Abdominal:      General: Abdomen is flat. Bowel sounds are normal. There is no distension.      Palpations: Abdomen is soft.      Tenderness: There is no abdominal tenderness.   Musculoskeletal:      Cervical back: Neck supple.   Feet:      Right foot:      Skin integrity: No ulcer, skin breakdown, erythema, warmth, callus or dry skin.      Left foot:      Skin integrity: No ulcer, " skin breakdown, erythema, warmth, callus or dry skin.   Lymphadenopathy:      Cervical: No cervical adenopathy.   Skin:     General: Skin is warm and dry.      Capillary Refill: Capillary refill takes less than 2 seconds.   Neurological:      General: No focal deficit present.      Mental Status: She is alert and oriented to person, place, and time.      Cranial Nerves: Cranial nerves 2-12 are intact.      Motor: No weakness or abnormal muscle tone.   Psychiatric:         Mood and Affect: Mood normal.         Speech: Speech normal.         Behavior: Behavior normal.           Patient's shoes and socks removed.    Right Foot/Ankle   Right Foot Inspection  Skin Exam: skin normal and skin intact. No dry skin, no warmth, no callus, no erythema, no maceration, no abnormal color, no pre-ulcer, no ulcer and no callus.     Toe Exam: ROM and strength within normal limits.     Sensory   Proprioception: intact  Monofilament testing: diminished    Vascular  Capillary refills: < 3 seconds  The right DP pulse is 2+. The right PT pulse is 2+.     Left Foot/Ankle  Left Foot Inspection  Skin Exam: skin normal and skin intact. No dry skin, no warmth, no erythema, no maceration, normal color, no pre-ulcer, no ulcer and no callus.     Toe Exam: ROM and strength within normal limits.     Sensory   Proprioception: intact  Monofilament testing: diminished    Vascular  Capillary refills: < 3 seconds  The left DP pulse is 2+. The left PT pulse is 2+.     Assign Risk Category  No deformity present  Loss of protective sensation  No weak pulses  Risk: 1

## 2024-11-22 NOTE — ASSESSMENT & PLAN NOTE
Lab Results   Component Value Date    HGBA1C 6.6 (A) 11/22/2024       Orders:    POCT hemoglobin A1c    Albumin / creatinine urine ratio    Lipid Panel with Direct LDL reflex; Future    IRIS Diabetic eye exam

## 2024-11-22 NOTE — ASSESSMENT & PLAN NOTE
Orders:    atorvastatin (LIPITOR) 40 mg tablet; Take 1 tablet (40 mg total) by mouth daily at bedtime

## 2024-11-22 NOTE — PATIENT INSTRUCTIONS
Medicare Preventive Visit Patient Instructions  Thank you for completing your Welcome to Medicare Visit or Medicare Annual Wellness Visit today. Your next wellness visit will be due in one year (11/23/2025).  The screening/preventive services that you may require over the next 5-10 years are detailed below. Some tests may not apply to you based off risk factors and/or age. Screening tests ordered at today's visit but not completed yet may show as past due. Also, please note that scanned in results may not display below.  Preventive Screenings:  Service Recommendations Previous Testing/Comments   Colorectal Cancer Screening  * Colonoscopy    * Fecal Occult Blood Test (FOBT)/Fecal Immunochemical Test (FIT)  * Fecal DNA/Cologuard Test  * Flexible Sigmoidoscopy Age: 45-75 years old   Colonoscopy: every 10 years (may be performed more frequently if at higher risk)  OR  FOBT/FIT: every 1 year  OR  Cologuard: every 3 years  OR  Sigmoidoscopy: every 5 years  Screening may be recommended earlier than age 45 if at higher risk for colorectal cancer. Also, an individualized decision between you and your healthcare provider will decide whether screening between the ages of 76-85 would be appropriate. Colonoscopy: Not on file  FOBT/FIT: Not on file  Cologuard: Not on file  Sigmoidoscopy: Not on file          Breast Cancer Screening Age: 40+ years old  Frequency: every 1-2 years  Not required if history of left and right mastectomy Mammogram: Not on file        Cervical Cancer Screening Between the ages of 21-29, pap smear recommended once every 3 years.   Between the ages of 30-65, can perform pap smear with HPV co-testing every 5 years.   Recommendations may differ for women with a history of total hysterectomy, cervical cancer, or abnormal pap smears in past. Pap Smear: Not on file    Screening Not Indicated   Hepatitis C Screening Once for adults born between 1945 and 1965  More frequently in patients at high risk for  Hepatitis C Hep C Antibody: 05/30/2020    Screening Current   Diabetes Screening 1-2 times per year if you're at risk for diabetes or have pre-diabetes Fasting glucose: 118 mg/dL (11/11/2020)  A1C: 6.8 (6/2/2021)  Screening Not Indicated  History Diabetes   Cholesterol Screening Once every 5 years if you don't have a lipid disorder. May order more often based on risk factors. Lipid panel: 11/11/2020    Screening Not Indicated  History Lipid Disorder     Other Preventive Screenings Covered by Medicare:  Abdominal Aortic Aneurysm (AAA) Screening: covered once if your at risk. You're considered to be at risk if you have a family history of AAA.  Lung Cancer Screening: covers low dose CT scan once per year if you meet all of the following conditions: (1) Age 55-77; (2) No signs or symptoms of lung cancer; (3) Current smoker or have quit smoking within the last 15 years; (4) You have a tobacco smoking history of at least 20 pack years (packs per day multiplied by number of years you smoked); (5) You get a written order from a healthcare provider.  Glaucoma Screening: covered annually if you're considered high risk: (1) You have diabetes OR (2) Family history of glaucoma OR (3)  aged 50 and older OR (4)  American aged 65 and older  Osteoporosis Screening: covered every 2 years if you meet one of the following conditions: (1) You're estrogen deficient and at risk for osteoporosis based off medical history and other findings; (2) Have a vertebral abnormality; (3) On glucocorticoid therapy for more than 3 months; (4) Have primary hyperparathyroidism; (5) On osteoporosis medications and need to assess response to drug therapy.   Last bone density test (DXA Scan): Not on file.  HIV Screening: covered annually if you're between the age of 15-65. Also covered annually if you are younger than 15 and older than 65 with risk factors for HIV infection. For pregnant patients, it is covered up to 3 times per  pregnancy.    Immunizations:  Immunization Recommendations   Influenza Vaccine Annual influenza vaccination during flu season is recommended for all persons aged >= 6 months who do not have contraindications   Pneumococcal Vaccine   * Pneumococcal conjugate vaccine = PCV13 (Prevnar 13), PCV15 (Vaxneuvance), PCV20 (Prevnar 20)  * Pneumococcal polysaccharide vaccine = PPSV23 (Pneumovax) Adults 19-63 yo with certain risk factors or if 65+ yo  If never received any pneumonia vaccine: recommend Prevnar 20 (PCV20)  Give PCV20 if previously received 1 dose of PCV13 or PPSV23   Hepatitis B Vaccine 3 dose series if at intermediate or high risk (ex: diabetes, end stage renal disease, liver disease)   Respiratory syncytial virus (RSV) Vaccine - COVERED BY MEDICARE PART D  * RSVPreF3 (Arexvy) CDC recommends that adults 60 years of age and older may receive a single dose of RSV vaccine using shared clinical decision-making (SCDM)   Tetanus (Td) Vaccine - COST NOT COVERED BY MEDICARE PART B Following completion of primary series, a booster dose should be given every 10 years to maintain immunity against tetanus. Td may also be given as tetanus wound prophylaxis.   Tdap Vaccine - COST NOT COVERED BY MEDICARE PART B Recommended at least once for all adults. For pregnant patients, recommended with each pregnancy.   Shingles Vaccine (Shingrix) - COST NOT COVERED BY MEDICARE PART B  2 shot series recommended in those 19 years and older who have or will have weakened immune systems or those 50 years and older     Health Maintenance Due:      Topic Date Due   • Breast Cancer Screening: Mammogram  Never done   • Colorectal Cancer Screening  Never done   • Hepatitis C Screening  Completed     Immunizations Due:      Topic Date Due   • COVID-19 Vaccine (3 - 2024-25 season) 09/01/2024     Advance Directives   What are advance directives?  Advance directives are legal documents that state your wishes and plans for medical care. These plans  are made ahead of time in case you lose your ability to make decisions for yourself. Advance directives can apply to any medical decision, such as the treatments you want, and if you want to donate organs.   What are the types of advance directives?  There are many types of advance directives, and each state has rules about how to use them. You may choose a combination of any of the following:  Living will:  This is a written record of the treatment you want. You can also choose which treatments you do not want, which to limit, and which to stop at a certain time. This includes surgery, medicine, IV fluid, and tube feedings.   Durable power of  for healthcare (DPAHC):  This is a written record that states who you want to make healthcare choices for you when you are unable to make them for yourself. This person, called a proxy, is usually a family member or a friend. You may choose more than 1 proxy.  Do not resuscitate (DNR) order:  A DNR order is used in case your heart stops beating or you stop breathing. It is a request not to have certain forms of treatment, such as CPR. A DNR order may be included in other types of advance directives.  Medical directive:  This covers the care that you want if you are in a coma, near death, or unable to make decisions for yourself. You can list the treatments you want for each condition. Treatment may include pain medicine, surgery, blood transfusions, dialysis, IV or tube feedings, and a ventilator (breathing machine).  Values history:  This document has questions about your views, beliefs, and how you feel and think about life. This information can help others choose the care that you would choose.  Why are advance directives important?  An advance directive helps you control your care. Although spoken wishes may be used, it is better to have your wishes written down. Spoken wishes can be misunderstood, or not followed. Treatments may be given even if you do not want  them. An advance directive may make it easier for your family to make difficult choices about your care.   Weight Management   Why it is important to manage your weight:  Being overweight increases your risk of health conditions such as heart disease, high blood pressure, type 2 diabetes, and certain types of cancer. It can also increase your risk for osteoarthritis, sleep apnea, and other respiratory problems. Aim for a slow, steady weight loss. Even a small amount of weight loss can lower your risk of health problems.  How to lose weight safely:  A safe and healthy way to lose weight is to eat fewer calories and get regular exercise. You can lose up about 1 pound a week by decreasing the number of calories you eat by 500 calories each day.   Healthy meal plan for weight management:  A healthy meal plan includes a variety of foods, contains fewer calories, and helps you stay healthy. A healthy meal plan includes the following:  Eat whole-grain foods more often.  A healthy meal plan should contain fiber. Fiber is the part of grains, fruits, and vegetables that is not broken down by your body. Whole-grain foods are healthy and provide extra fiber in your diet. Some examples of whole-grain foods are whole-wheat breads and pastas, oatmeal, brown rice, and bulgur.  Eat a variety of vegetables every day.  Include dark, leafy greens such as spinach, kale, josie greens, and mustard greens. Eat yellow and orange vegetables such as carrots, sweet potatoes, and winter squash.   Eat a variety of fruits every day.  Choose fresh or canned fruit (canned in its own juice or light syrup) instead of juice. Fruit juice has very little or no fiber.  Eat low-fat dairy foods.  Drink fat-free (skim) milk or 1% milk. Eat fat-free yogurt and low-fat cottage cheese. Try low-fat cheeses such as mozzarella and other reduced-fat cheeses.  Choose meat and other protein foods that are low in fat.  Choose beans or other legumes such as split  peas or lentils. Choose fish, skinless poultry (chicken or turkey), or lean cuts of red meat (beef or pork). Before you cook meat or poultry, cut off any visible fat.   Use less fat and oil.  Try baking foods instead of frying them. Add less fat, such as margarine, sour cream, regular salad dressing and mayonnaise to foods. Eat fewer high-fat foods. Some examples of high-fat foods include french fries, doughnuts, ice cream, and cakes.  Eat fewer sweets.  Limit foods and drinks that are high in sugar. This includes candy, cookies, regular soda, and sweetened drinks.  Exercise:  Exercise at least 30 minutes per day on most days of the week. Some examples of exercise include walking, biking, dancing, and swimming. You can also fit in more physical activity by taking the stairs instead of the elevator or parking farther away from stores. Ask your healthcare provider about the best exercise plan for you.      © Copyright [employer] 2018 Information is for End User's use only and may not be sold, redistributed or otherwise used for commercial purposes. All illustrations and images included in CareNotes® are the copyrighted property of [employer]., Inc. or [employer]

## 2024-11-22 NOTE — ASSESSMENT & PLAN NOTE
Orders:    Comprehensive metabolic panel; Future    CBC and Platelet; Future    metoprolol succinate (TOPROL-XL) 25 mg 24 hr tablet; Take 1 tablet (25 mg total) by mouth every evening    atorvastatin (LIPITOR) 40 mg tablet; Take 1 tablet (40 mg total) by mouth daily at bedtime    diltiazem (CARDIZEM CD) 240 mg 24 hr capsule; Take 1 capsule (240 mg total) by mouth daily

## 2025-01-28 ENCOUNTER — OFFICE VISIT (OUTPATIENT)
Dept: FAMILY MEDICINE CLINIC | Facility: CLINIC | Age: 70
End: 2025-01-28
Payer: COMMERCIAL

## 2025-01-28 VITALS
HEIGHT: 57 IN | SYSTOLIC BLOOD PRESSURE: 130 MMHG | BODY MASS INDEX: 31.28 KG/M2 | OXYGEN SATURATION: 95 % | DIASTOLIC BLOOD PRESSURE: 82 MMHG | TEMPERATURE: 97.4 F | WEIGHT: 145 LBS | HEART RATE: 75 BPM

## 2025-01-28 DIAGNOSIS — M54.6 CHRONIC MIDLINE THORACIC BACK PAIN: Primary | ICD-10-CM

## 2025-01-28 DIAGNOSIS — E11.9 TYPE 2 DIABETES MELLITUS WITHOUT COMPLICATION, WITHOUT LONG-TERM CURRENT USE OF INSULIN (HCC): ICD-10-CM

## 2025-01-28 DIAGNOSIS — G89.29 CHRONIC MIDLINE THORACIC BACK PAIN: Primary | ICD-10-CM

## 2025-01-28 PROCEDURE — G2211 COMPLEX E/M VISIT ADD ON: HCPCS | Performed by: NURSE PRACTITIONER

## 2025-01-28 PROCEDURE — 99214 OFFICE O/P EST MOD 30 MIN: CPT | Performed by: NURSE PRACTITIONER

## 2025-01-28 RX ORDER — CYCLOBENZAPRINE HCL 5 MG
5 TABLET ORAL
Qty: 15 TABLET | Refills: 0 | Status: SHIPPED | OUTPATIENT
Start: 2025-01-28

## 2025-01-28 RX ORDER — MELOXICAM 15 MG/1
15 TABLET ORAL DAILY PRN
Qty: 30 TABLET | Refills: 3 | Status: SHIPPED | OUTPATIENT
Start: 2025-01-28

## 2025-01-28 NOTE — PROGRESS NOTES
"Name: Jojo Brown      : 1955      MRN: [id number]  Encounter Provider: BETTE Prescott  Encounter Date: 2025   Encounter department: [facility]  :  Assessment & Plan  Chronic midline thoracic back pain    Orders:  •  cyclobenzaprine (FLEXERIL) 5 mg tablet; Take 1 tablet (5 mg total) by mouth daily at bedtime  •  meloxicam (MOBIC) 15 mg tablet; Take 1 tablet (15 mg total) by mouth daily as needed for moderate pain  •  Ambulatory Referral to Physical Therapy; Future    Type 2 diabetes mellitus without complication, without long-term current use of insulin (HCC)  Patient is due for next hemoglobin A1c and follow-up appointment in May 2025  Lab Results   Component Value Date    HGBA1C 6.6 (A) 2024                   History of Present Illness   69 y.o.female presenting with midline thoracic pain that is chronic but recently seems to be worsening. She reports the pain to be like a fist pressure on her spine. She does have a history of scoliosis. She reports that she can stand for approximately 5 minutes before she has to sit down to relieve the pain. She has been using a heating pad and ibuprofen to mange the symptoms. She is also looking to obtain some assistance in the home for completion of ADLs.           Review of Systems   Constitutional: Negative.    Respiratory: Negative.     Cardiovascular: Negative.    Gastrointestinal: Negative.    Musculoskeletal:  Positive for back pain.   Neurological: Negative.    Psychiatric/Behavioral: Negative.         Objective   /82 (BP Location: Left arm, Patient Position: Sitting, Cuff Size: Large)   Pulse 75   Temp (!) 97.4 °F (36.3 °C) (Temporal)   Ht 4' 9\" (1.448 m)   Wt 65.8 kg (145 lb)   SpO2 95%   BMI 31.38 kg/m² (Reviewed)     Physical Exam  Vitals reviewed.   Constitutional:       General: She is not in acute distress.     Appearance: She is not ill-appearing.   HENT:      Head: Normocephalic and atraumatic. "      Right Ear: External ear normal.      Left Ear: External ear normal.   Eyes:      Extraocular Movements: Extraocular movements intact.      Conjunctiva/sclera: Conjunctivae normal.      Pupils: Pupils are equal, round, and reactive to light.   Cardiovascular:      Rate and Rhythm: Normal rate and regular rhythm.      Heart sounds: Normal heart sounds.   Pulmonary:      Effort: Pulmonary effort is normal.      Breath sounds: Normal breath sounds.   Abdominal:      General: Abdomen is flat. Bowel sounds are normal.      Palpations: Abdomen is soft.   Musculoskeletal:      Thoracic back: Spasms and tenderness present. No swelling or deformity. Scoliosis present.        Back:    Skin:     General: Skin is warm and dry.      Capillary Refill: Capillary refill takes less than 2 seconds.   Neurological:      Mental Status: She is alert and oriented to person, place, and time.   Psychiatric:         Mood and Affect: Mood normal.         Behavior: Behavior normal.       Emirati interpretor used # [id number]

## 2025-01-28 NOTE — ASSESSMENT & PLAN NOTE
Patient is due for next hemoglobin A1c and follow-up appointment in May 2025  Lab Results   Component Value Date    HGBA1C 6.6 (A) 11/22/2024

## 2025-02-05 ENCOUNTER — EVALUATION (OUTPATIENT)
Dept: PHYSICAL THERAPY | Facility: CLINIC | Age: 70
End: 2025-02-05
Payer: COMMERCIAL

## 2025-02-05 DIAGNOSIS — M54.6 CHRONIC MIDLINE THORACIC BACK PAIN: ICD-10-CM

## 2025-02-05 DIAGNOSIS — G89.29 CHRONIC MIDLINE THORACIC BACK PAIN: ICD-10-CM

## 2025-02-05 PROCEDURE — 97161 PT EVAL LOW COMPLEX 20 MIN: CPT | Performed by: PHYSICAL THERAPIST

## 2025-02-05 PROCEDURE — 97110 THERAPEUTIC EXERCISES: CPT | Performed by: PHYSICAL THERAPIST

## 2025-02-05 NOTE — PROGRESS NOTES
PT Evaluation     Today's date: 2025  Patient name: Jojo Brown  : 1955  MRN: [id number]  Referring provider: Kanika Hill CRNP  Dx:   Encounter Diagnosis     ICD-10-CM    1. Chronic midline thoracic back pain  M54.6 Ambulatory Referral to Physical Therapy    G89.29                      Assessment  Impairments: abnormal or restricted ROM, activity intolerance, impaired physical strength, lacks appropriate home exercise program and pain with function  Symptom irritability: moderate    Assessment details: Jojo Brown is a 69 y.o. female presenting to outpatient physical therapy at [facility] with complaints of lumbopelvic pain, stiffness and weakness.  She presents with decreased range of motion, decreased strength, limited flexibility, poor postural awareness, poor body mechanics, altered gait pattern, poor balance, decreased tolerance to activity and decreased functional mobility due to Chronic midline thoracic back pain.  She would benefit from skilled PT services in order to address these deficits and reach maximum level of function.  Thank you for the referral!    Understanding of Dx/Px/POC: fair     Prognosis: fair    Goals  STG  1. Independent with HEP in 2 weeks  2. Decrease pain at worst by 50% in 2 weeks    LTG  1. Increase lumbopelvic strength in all planes to 5/5 in 4 weeks  2. Return to full, pain-free and unrestricted standing, walking and stair climbing in 4 weeks        Plan  Patient would benefit from: skilled physical therapy  Planned modality interventions: thermotherapy: hydrocollator packs    Planned therapy interventions: manual therapy, neuromuscular re-education, therapeutic activities and therapeutic exercise    Duration in weeks: 4  Treatment plan discussed with: patient, caregiver and family  Plan details: Pt will trial independent management of HEP and call clinic PRN    Subjective Evaluation    History of Present Illness  Mechanism of injury: Pt reports chronic  history of midline low back pain with insidious onset. No pain with coughing/sneezing. Pain is worse with standing > 5 minutes. No pain at night.   Quality of life: good    Patient Goals  Patient goals for therapy: decreased pain, increased strength and independence with ADLs/IADLs  Patient goal: stand and walk > 5 mintues without pain for ADLs and household chores  Pain  Current pain ratin  At best pain ratin  At worst pain rating: 10  Quality: dull ache  Relieving factors: heat and medications  Aggravating factors: walking, standing and stair climbing  Progression: worsening        Objective     Neurological Testing     Sensation     Lumbar   Left   Intact: light touch    Right   Intact: light touch  Mechanical Assessment    Cervical      Thoracic      Lumbar    Standing flexion: repeated movements   Pain intensity: better  Pain level: decreased  Standing extension: repeated movements  Pain intensity: worse  Pain level: increased    Strength/Myotome Testing     Lumbar   Left   Heel walk: normal  Toe walk: normal    Right   Heel walk: normal  Toe walk: normal    Left Hip   Planes of Motion   Flexion: 5  Extension: 4  Abduction: 4+  Adduction: 5    Right Hip   Planes of Motion   Flexion: 5  Extension: 4  Abduction: 4+  Adduction: 5    Left Knee   Flexion: 5  Extension: 5    Right Knee   Extension: 5    Left Ankle/Foot   Dorsiflexion: 5  Plantar flexion: 5    Right Ankle/Foot   Dorsiflexion: 5  Plantar flexion: 5  Great toe extension: 5    Tests     Lumbar   Positive prone instability .   Negative Valsalva.     Left   Negative passive SLR and slump test.     Right   Negative passive SLR and slump test.     Left Hip   Positive STEVE.   Negative FADIR.     Right Hip   Positive STEVE.   Negative FADIR.     Additional Tests Details  Bridge iso 25 sec w/pain          Daily Treatment Diary       HEP ACCESS CODE: [id number]   FOTO Completed On: 2025     POC Expires Reeval for Medicare to be completed  Unit Limit  "Auth Expiration Date PT/OT/STVisit Limit   3/5/2025 na na 1/28/2026 1                       Auth Status DATE 2/5        Pending Visit # 1         Remaining 0        MANUAL THERAPY                                                               THERAPEUTIC EXERCISE HEP         Seated and standing lumbar flexion stretch 2/5 10x10\" ea        SKTC stretch 2/5 10x10\" ea        LTR stretch 2/5 10x10\" ea                                                                                                                                NEUROMUSCULAR REEDUCATION                                                                                                                                                       THERAPEUTIC ACTIVITY          Patient education: pathoanatomy, nature of sxs, POC, HEP  NS                                      GAIT TRAINING                                                  MODALITIES                                         "

## 2025-02-13 DIAGNOSIS — M54.6 CHRONIC MIDLINE THORACIC BACK PAIN: ICD-10-CM

## 2025-02-13 DIAGNOSIS — G89.29 CHRONIC MIDLINE THORACIC BACK PAIN: ICD-10-CM

## 2025-02-14 DIAGNOSIS — I10 BENIGN ESSENTIAL HTN: ICD-10-CM

## 2025-02-14 DIAGNOSIS — E11.9 TYPE 2 DIABETES MELLITUS WITHOUT COMPLICATION, WITHOUT LONG-TERM CURRENT USE OF INSULIN (HCC): ICD-10-CM

## 2025-02-14 RX ORDER — METOPROLOL SUCCINATE 25 MG/1
25 TABLET, EXTENDED RELEASE ORAL EVERY EVENING
Qty: 90 TABLET | Refills: 1 | Status: SHIPPED | OUTPATIENT
Start: 2025-02-14

## 2025-02-14 RX ORDER — HYDROCHLOROTHIAZIDE 25 MG/1
25 TABLET ORAL DAILY
Qty: 90 TABLET | Refills: 3 | Status: SHIPPED | OUTPATIENT
Start: 2025-02-14

## 2025-02-14 RX ORDER — POTASSIUM CHLORIDE 1500 MG/1
20 TABLET, EXTENDED RELEASE ORAL 2 TIMES DAILY
Qty: 60 TABLET | Refills: 3 | Status: SHIPPED | OUTPATIENT
Start: 2025-02-14

## 2025-02-14 RX ORDER — CYCLOBENZAPRINE HCL 5 MG
5 TABLET ORAL
Qty: 30 TABLET | Refills: 0 | Status: SHIPPED | OUTPATIENT
Start: 2025-02-14

## 2025-02-14 NOTE — TELEPHONE ENCOUNTER
Reason for call:   [x] Refill   [] Prior Auth  [] Other:     Office:   [x] PCP/Provider -   [] Specialty/Provider -     Medication: hydrochlorothiazide 25 mg, take 1 tablet by mouth once daily                       Metformin 1000 mg, take 1 tablet by mouth twice a day                        Metoprolol Succinate 25 mg, take 1 tablet by mouth every evening                         Potassium Chloride 20 mEq, take 1 tablet by mouth twice a day     Pharmacy: [facility]     Does the patient have enough for 3 days?   [] Yes   [x] No - Send as HP to POD

## 2025-03-08 DIAGNOSIS — I10 BENIGN ESSENTIAL HTN: ICD-10-CM

## 2025-03-10 RX ORDER — POTASSIUM CHLORIDE 1500 MG/1
20 TABLET, EXTENDED RELEASE ORAL 2 TIMES DAILY
Qty: 180 TABLET | Refills: 2 | Status: SHIPPED | OUTPATIENT
Start: 2025-03-10

## 2025-05-28 ENCOUNTER — OFFICE VISIT (OUTPATIENT)
Dept: FAMILY MEDICINE CLINIC | Facility: CLINIC | Age: 70
End: 2025-05-28
Payer: COMMERCIAL

## 2025-05-28 VITALS
TEMPERATURE: 97.7 F | HEART RATE: 85 BPM | DIASTOLIC BLOOD PRESSURE: 74 MMHG | BODY MASS INDEX: 30.14 KG/M2 | SYSTOLIC BLOOD PRESSURE: 126 MMHG | OXYGEN SATURATION: 93 % | WEIGHT: 149.5 LBS | HEIGHT: 59 IN

## 2025-05-28 DIAGNOSIS — I10 BENIGN ESSENTIAL HTN: ICD-10-CM

## 2025-05-28 DIAGNOSIS — E11.9 TYPE 2 DIABETES MELLITUS WITHOUT COMPLICATION, WITHOUT LONG-TERM CURRENT USE OF INSULIN (HCC): Primary | ICD-10-CM

## 2025-05-28 DIAGNOSIS — M25.511 ACUTE PAIN OF RIGHT SHOULDER: ICD-10-CM

## 2025-05-28 LAB — SL AMB POCT HEMOGLOBIN AIC: 7.8 (ref ?–6.5)

## 2025-05-28 PROCEDURE — 83036 HEMOGLOBIN GLYCOSYLATED A1C: CPT | Performed by: NURSE PRACTITIONER

## 2025-05-28 PROCEDURE — G2211 COMPLEX E/M VISIT ADD ON: HCPCS | Performed by: NURSE PRACTITIONER

## 2025-05-28 PROCEDURE — 99214 OFFICE O/P EST MOD 30 MIN: CPT | Performed by: NURSE PRACTITIONER

## 2025-05-28 RX ORDER — BLOOD SUGAR DIAGNOSTIC
STRIP MISCELLANEOUS
Qty: 100 EACH | Refills: 3 | Status: SHIPPED | OUTPATIENT
Start: 2025-05-28

## 2025-05-28 RX ORDER — LANCETS 33 GAUGE
EACH MISCELLANEOUS
Qty: 100 EACH | Refills: 3 | Status: SHIPPED | OUTPATIENT
Start: 2025-05-28

## 2025-05-28 RX ORDER — BLOOD-GLUCOSE METER
KIT MISCELLANEOUS
Qty: 1 KIT | Refills: 0 | Status: SHIPPED | OUTPATIENT
Start: 2025-05-28

## 2025-05-28 NOTE — ASSESSMENT & PLAN NOTE
Hemoglobin A1c above goal so discussed with patient plan to add-on another medication with Metformin  Lab Results   Component Value Date    HGBA1C 7.8 (A) 05/28/2025       Orders:  •  Empagliflozin 25 MG TABS; Take 1 tablet (25 mg total) by mouth daily  •  Blood Glucose Monitoring Suppl (OneTouch Verio Reflect) w/Device KIT; Check blood sugars once daily. Please substitute with appropriate alternative as covered by patient's insurance. Dx: E11.65  •  glucose blood (OneTouch Verio) test strip; Check blood sugars once daily. Please substitute with appropriate alternative as covered by patient's insurance. Dx: E11.65  •  OneTouch Delica Lancets 33G MISC; Check blood sugars once daily. Please substitute with appropriate alternative as covered by patient's insurance. Dx: E11.65  •  Albumin / creatinine urine ratio; Future  •  Comprehensive metabolic panel; Future  •  Hemoglobin A1C; Future  •  POCT hemoglobin A1c

## 2025-05-28 NOTE — PROGRESS NOTES
Name: Jojo Brown      : 1955      MRN: [id number]  Encounter Provider: BETTE Prescott  Encounter Date: 2025   Encounter department: [facility]  :  Assessment & Plan  Type 2 diabetes mellitus without complication, without long-term current use of insulin (HCC)  Hemoglobin A1c above goal so discussed with patient plan to add-on another medication with Metformin  Lab Results   Component Value Date    HGBA1C 7.8 (A) 2025       Orders:  •  Empagliflozin 25 MG TABS; Take 1 tablet (25 mg total) by mouth daily  •  Blood Glucose Monitoring Suppl (OneTouch Verio Reflect) w/Device KIT; Check blood sugars once daily. Please substitute with appropriate alternative as covered by patient's insurance. Dx: E11.65  •  glucose blood (OneTouch Verio) test strip; Check blood sugars once daily. Please substitute with appropriate alternative as covered by patient's insurance. Dx: E11.65  •  OneTouch Delica Lancets 33G MISC; Check blood sugars once daily. Please substitute with appropriate alternative as covered by patient's insurance. Dx: E11.65  •  Albumin / creatinine urine ratio; Future  •  Comprehensive metabolic panel; Future  •  Hemoglobin A1C; Future  •  POCT hemoglobin A1c    Acute pain of right shoulder    Orders:  •  XR shoulder 2+ vw right; Future    Benign essential HTN  Stable - continue on current medication and recheck in 6 months         Depression Screening and Follow-up Plan: Patient was screened for depression during today's encounter. They screened negative with a PHQ-2 score of 0.        History of Present Illness   70 y.o.female presenting for a routine 6 month follow-up for chronic medical conditions.          Review of Systems   Constitutional: Negative.    HENT: Negative.     Respiratory: Negative.     Cardiovascular: Negative.    Gastrointestinal: Negative.    Genitourinary: Negative.    Musculoskeletal:  Positive for myalgias.   Neurological:  Positive for  "numbness (fingertips neuropathy). Negative for dizziness, weakness and light-headedness.   Psychiatric/Behavioral: Negative.         Objective   /74   Pulse 85   Temp 97.7 °F (36.5 °C)   Ht 4' 10.8\" (1.494 m)   Wt 67.8 kg (149 lb 8 oz)   SpO2 93%   BMI 30.40 kg/m² (Reviewed)     Physical Exam  Vitals reviewed.   Constitutional:       General: She is not in acute distress.     Appearance: She is not ill-appearing.   HENT:      Head: Normocephalic and atraumatic.      Right Ear: External ear normal.      Left Ear: External ear normal.      Nose: Nose normal.      Mouth/Throat:      Mouth: Mucous membranes are moist.      Pharynx: Oropharynx is clear.     Eyes:      Extraocular Movements: Extraocular movements intact.      Conjunctiva/sclera: Conjunctivae normal.      Pupils: Pupils are equal, round, and reactive to light.       Cardiovascular:      Rate and Rhythm: Normal rate and regular rhythm.      Heart sounds: Normal heart sounds.   Pulmonary:      Effort: Pulmonary effort is normal.      Breath sounds: Normal breath sounds.   Abdominal:      General: Abdomen is flat. There is no distension.      Palpations: Abdomen is soft.      Tenderness: There is no abdominal tenderness.     Musculoskeletal:      Right shoulder: Tenderness present. No swelling, deformity, effusion or bony tenderness. Normal range of motion. Normal strength.      Cervical back: Neck supple.     Skin:     General: Skin is warm and dry.      Capillary Refill: Capillary refill takes less than 2 seconds.     Neurological:      Mental Status: She is alert and oriented to person, place, and time.     Psychiatric:         Mood and Affect: Mood normal.         Behavior: Behavior normal.         "

## 2025-06-05 ENCOUNTER — HOSPITAL ENCOUNTER (OUTPATIENT)
Dept: RADIOLOGY | Facility: HOSPITAL | Age: 70
End: 2025-06-05
Payer: COMMERCIAL

## 2025-06-05 ENCOUNTER — RESULTS FOLLOW-UP (OUTPATIENT)
Dept: FAMILY MEDICINE CLINIC | Facility: CLINIC | Age: 70
End: 2025-06-05

## 2025-06-05 ENCOUNTER — APPOINTMENT (OUTPATIENT)
Dept: LAB | Facility: HOSPITAL | Age: 70
End: 2025-06-05
Payer: COMMERCIAL

## 2025-06-05 DIAGNOSIS — I10 BENIGN ESSENTIAL HTN: ICD-10-CM

## 2025-06-05 DIAGNOSIS — E87.6 HYPOKALEMIA: ICD-10-CM

## 2025-06-05 DIAGNOSIS — E11.9 TYPE 2 DIABETES MELLITUS WITHOUT COMPLICATION, WITHOUT LONG-TERM CURRENT USE OF INSULIN (HCC): ICD-10-CM

## 2025-06-05 DIAGNOSIS — M25.511 ACUTE PAIN OF RIGHT SHOULDER: ICD-10-CM

## 2025-06-05 DIAGNOSIS — E55.9 VITAMIN D DEFICIENCY: ICD-10-CM

## 2025-06-05 LAB
25(OH)D3 SERPL-MCNC: 48.8 NG/ML (ref 30–100)
ALBUMIN SERPL BCG-MCNC: 4.2 G/DL (ref 3.5–5)
ALP SERPL-CCNC: 75 U/L (ref 34–104)
ALT SERPL W P-5'-P-CCNC: 15 U/L (ref 7–52)
ANION GAP SERPL CALCULATED.3IONS-SCNC: 7 MMOL/L (ref 4–13)
AST SERPL W P-5'-P-CCNC: 15 U/L (ref 13–39)
BILIRUB SERPL-MCNC: 0.52 MG/DL (ref 0.2–1)
BUN SERPL-MCNC: 13 MG/DL (ref 5–25)
CALCIUM SERPL-MCNC: 8.9 MG/DL (ref 8.4–10.2)
CHLORIDE SERPL-SCNC: 102 MMOL/L (ref 96–108)
CHOLEST SERPL-MCNC: 143 MG/DL (ref ?–200)
CO2 SERPL-SCNC: 28 MMOL/L (ref 21–32)
CREAT SERPL-MCNC: 0.37 MG/DL (ref 0.6–1.3)
ERYTHROCYTE [DISTWIDTH] IN BLOOD BY AUTOMATED COUNT: 12.9 % (ref 11.6–15.1)
GFR SERPL CREATININE-BSD FRML MDRD: 107 ML/MIN/1.73SQ M
GLUCOSE P FAST SERPL-MCNC: 132 MG/DL (ref 65–99)
HCT VFR BLD AUTO: 42.4 % (ref 34.8–46.1)
HDLC SERPL-MCNC: 39 MG/DL
HGB BLD-MCNC: 13.7 G/DL (ref 11.5–15.4)
LDLC SERPL CALC-MCNC: 79 MG/DL (ref 0–100)
MCH RBC QN AUTO: 31 PG (ref 26.8–34.3)
MCHC RBC AUTO-ENTMCNC: 32.3 G/DL (ref 31.4–37.4)
MCV RBC AUTO: 96 FL (ref 82–98)
PLATELET # BLD AUTO: 236 THOUSANDS/UL (ref 149–390)
PMV BLD AUTO: 9.9 FL (ref 8.9–12.7)
POTASSIUM SERPL-SCNC: 4 MMOL/L (ref 3.5–5.3)
PROT SERPL-MCNC: 7 G/DL (ref 6.4–8.4)
RBC # BLD AUTO: 4.42 MILLION/UL (ref 3.81–5.12)
SODIUM SERPL-SCNC: 137 MMOL/L (ref 135–147)
TRIGL SERPL-MCNC: 124 MG/DL (ref ?–150)
WBC # BLD AUTO: 6.26 THOUSAND/UL (ref 4.31–10.16)

## 2025-06-05 PROCEDURE — 36415 COLL VENOUS BLD VENIPUNCTURE: CPT

## 2025-06-05 PROCEDURE — 80053 COMPREHEN METABOLIC PANEL: CPT

## 2025-06-05 PROCEDURE — 85027 COMPLETE CBC AUTOMATED: CPT

## 2025-06-05 PROCEDURE — 73030 X-RAY EXAM OF SHOULDER: CPT

## 2025-06-05 PROCEDURE — 80061 LIPID PANEL: CPT

## 2025-06-05 PROCEDURE — 82306 VITAMIN D 25 HYDROXY: CPT

## 2025-06-13 ENCOUNTER — RESULTS FOLLOW-UP (OUTPATIENT)
Dept: FAMILY MEDICINE CLINIC | Facility: CLINIC | Age: 70
End: 2025-06-13

## 2025-08-12 ENCOUNTER — TELEPHONE (OUTPATIENT)
Age: 70
End: 2025-08-12

## 2025-08-20 PROBLEM — M19.90 ARTHRITIS: Status: ACTIVE | Noted: 2025-08-20

## 2025-08-20 PROBLEM — E11.65 TYPE 2 DIABETES MELLITUS WITH HYPERGLYCEMIA (HCC): Status: ACTIVE | Noted: 2025-08-20
